# Patient Record
Sex: FEMALE | Race: ASIAN | NOT HISPANIC OR LATINO | Employment: FULL TIME | URBAN - METROPOLITAN AREA
[De-identification: names, ages, dates, MRNs, and addresses within clinical notes are randomized per-mention and may not be internally consistent; named-entity substitution may affect disease eponyms.]

---

## 2017-08-26 ENCOUNTER — HOSPITAL ENCOUNTER (OUTPATIENT)
Dept: LAB | Facility: MEDICAL CENTER | Age: 22
End: 2017-08-26
Payer: COMMERCIAL

## 2017-08-26 LAB
BDY FAT % MEASURED: 28.3 %
BP DIAS: 78 MMHG
BP SYS: 116 MMHG
CHOLEST SERPL-MCNC: 149 MG/DL (ref 100–199)
DIABETES HTDIA: NO
EVENT NAME HTEVT: NORMAL
FASTING HTFAS: YES
FASTING STATUS PATIENT QL REPORTED: NORMAL
GLUCOSE SERPL-MCNC: 75 MG/DL (ref 65–99)
HDLC SERPL-MCNC: 55 MG/DL
HYPERTENSION HTHYP: NO
LDLC SERPL CALC-MCNC: 77 MG/DL
SCREENING LOC CITY HTCIT: NORMAL
SCREENING LOC STATE HTSTA: NORMAL
SCREENING LOCATION HTLOC: NORMAL
SMOKING HTSMO: NO
SUBSCRIBER ID HTSID: NORMAL
TRIGL SERPL-MCNC: 85 MG/DL (ref 0–149)

## 2017-08-26 PROCEDURE — S5190 WELLNESS ASSESSMENT BY NONPH: HCPCS

## 2017-08-26 PROCEDURE — 80061 LIPID PANEL: CPT

## 2017-08-26 PROCEDURE — 36415 COLL VENOUS BLD VENIPUNCTURE: CPT

## 2017-08-26 PROCEDURE — 82947 ASSAY GLUCOSE BLOOD QUANT: CPT

## 2017-10-06 ENCOUNTER — IMMUNIZATION (OUTPATIENT)
Dept: OCCUPATIONAL MEDICINE | Facility: CLINIC | Age: 22
End: 2017-10-06

## 2017-10-06 DIAGNOSIS — Z23 NEED FOR VACCINATION: ICD-10-CM

## 2017-10-06 PROCEDURE — 90686 IIV4 VACC NO PRSV 0.5 ML IM: CPT | Performed by: PREVENTIVE MEDICINE

## 2017-10-14 ENCOUNTER — EH NON-PROVIDER (OUTPATIENT)
Dept: OCCUPATIONAL MEDICINE | Facility: CLINIC | Age: 22
End: 2017-10-14

## 2017-10-14 PROCEDURE — 94375 RESPIRATORY FLOW VOLUME LOOP: CPT | Performed by: PREVENTIVE MEDICINE

## 2017-11-13 DIAGNOSIS — Z30.8 ENCOUNTER FOR OTHER CONTRACEPTIVE MANAGEMENT: ICD-10-CM

## 2017-11-13 RX ORDER — NORGESTIMATE AND ETHINYL ESTRADIOL 7DAYSX3 LO
1 KIT ORAL DAILY
Qty: 84 TAB | Refills: 4 | Status: ON HOLD | OUTPATIENT
Start: 2017-11-13 | End: 2018-12-14

## 2018-04-25 ENCOUNTER — HOSPITAL ENCOUNTER (OUTPATIENT)
Dept: LAB | Facility: MEDICAL CENTER | Age: 23
End: 2018-04-25
Attending: NURSE PRACTITIONER
Payer: COMMERCIAL

## 2018-04-25 LAB
ABO GROUP BLD: NORMAL
BASOPHILS # BLD AUTO: 0.5 % (ref 0–1.8)
BASOPHILS # BLD: 0.05 K/UL (ref 0–0.12)
BLD GP AB SCN SERPL QL: NORMAL
EOSINOPHIL # BLD AUTO: 0.14 K/UL (ref 0–0.51)
EOSINOPHIL NFR BLD: 1.4 % (ref 0–6.9)
ERYTHROCYTE [DISTWIDTH] IN BLOOD BY AUTOMATED COUNT: 44 FL (ref 35.9–50)
HBV SURFACE AG SER QL: NEGATIVE
HCT VFR BLD AUTO: 40.4 % (ref 37–47)
HGB BLD-MCNC: 13.1 G/DL (ref 12–16)
HIV 1+2 AB+HIV1 P24 AG SERPL QL IA: NON REACTIVE
IMM GRANULOCYTES # BLD AUTO: 0.03 K/UL (ref 0–0.11)
IMM GRANULOCYTES NFR BLD AUTO: 0.3 % (ref 0–0.9)
LYMPHOCYTES # BLD AUTO: 2.08 K/UL (ref 1–4.8)
LYMPHOCYTES NFR BLD: 21 % (ref 22–41)
MCH RBC QN AUTO: 31 PG (ref 27–33)
MCHC RBC AUTO-ENTMCNC: 32.4 G/DL (ref 33.6–35)
MCV RBC AUTO: 95.5 FL (ref 81.4–97.8)
MONOCYTES # BLD AUTO: 0.51 K/UL (ref 0–0.85)
MONOCYTES NFR BLD AUTO: 5.2 % (ref 0–13.4)
NEUTROPHILS # BLD AUTO: 7.09 K/UL (ref 2–7.15)
NEUTROPHILS NFR BLD: 71.6 % (ref 44–72)
NRBC # BLD AUTO: 0 K/UL
NRBC BLD-RTO: 0 /100 WBC
PLATELET # BLD AUTO: 293 K/UL (ref 164–446)
PMV BLD AUTO: 9.7 FL (ref 9–12.9)
RBC # BLD AUTO: 4.23 M/UL (ref 4.2–5.4)
RH BLD: NORMAL
RUBV AB SER QL: 44.6 IU/ML
TREPONEMA PALLIDUM IGG+IGM AB [PRESENCE] IN SERUM OR PLASMA BY IMMUNOASSAY: NON REACTIVE
VZV IGG SER IA-ACNC: 0.12
WBC # BLD AUTO: 9.9 K/UL (ref 4.8–10.8)

## 2018-04-25 PROCEDURE — 86850 RBC ANTIBODY SCREEN: CPT

## 2018-04-25 PROCEDURE — 87086 URINE CULTURE/COLONY COUNT: CPT

## 2018-04-25 PROCEDURE — 86900 BLOOD TYPING SEROLOGIC ABO: CPT

## 2018-04-25 PROCEDURE — 86780 TREPONEMA PALLIDUM: CPT

## 2018-04-25 PROCEDURE — 86762 RUBELLA ANTIBODY: CPT

## 2018-04-25 PROCEDURE — 87389 HIV-1 AG W/HIV-1&-2 AB AG IA: CPT

## 2018-04-25 PROCEDURE — 86901 BLOOD TYPING SEROLOGIC RH(D): CPT

## 2018-04-25 PROCEDURE — 86787 VARICELLA-ZOSTER ANTIBODY: CPT

## 2018-04-25 PROCEDURE — 87340 HEPATITIS B SURFACE AG IA: CPT

## 2018-04-25 PROCEDURE — 85025 COMPLETE CBC W/AUTO DIFF WBC: CPT

## 2018-04-25 PROCEDURE — 36415 COLL VENOUS BLD VENIPUNCTURE: CPT

## 2018-04-27 LAB
BACTERIA UR CULT: NORMAL
SIGNIFICANT IND 70042: NORMAL
SITE SITE: NORMAL
SOURCE SOURCE: NORMAL

## 2018-06-04 ENCOUNTER — HOSPITAL ENCOUNTER (OUTPATIENT)
Dept: LAB | Facility: MEDICAL CENTER | Age: 23
End: 2018-06-04
Attending: OBSTETRICS & GYNECOLOGY
Payer: COMMERCIAL

## 2018-06-04 PROCEDURE — 81508 FTL CGEN ABNOR TWO PROTEINS: CPT

## 2018-06-04 PROCEDURE — 36415 COLL VENOUS BLD VENIPUNCTURE: CPT

## 2018-06-07 LAB
# FETUSES US: NORMAL
AGE - REPORTED: 25.2 YR
CURRENT SMOKER: NO
FET CRL US.MEAS: 61.4 MM
FET CRL US.MEAS: NORMAL MM
FET NUCHAL FOLD MOM THICKNESS US.MEAS: 0.78
FET NUCHAL FOLD MOM THICKNESS US.MEAS: NORMAL
FET NUCHAL FOLD THICKNESS US.MEAS: 1.2 MM
GA: NORMAL WK
HCG MOM SERPL: 0.56
HCG SERPL-ACNC: NORMAL IU/L
HX OF HEREDITARY DISORDERS: NO
INTEGRATED SCN PATIENT-IMP: NORMAL
NUCHAL TRANSLUCENCY (NT), TWIN B Q0252: NORMAL MM
PAPP-A MOM SERPL: 0.96
PAPP-A SERPL-MCNC: 1668.4 NG/ML
PATHOLOGY STUDY: NORMAL
SONOGRAPHER NAME: NORMAL
SONOGRAPHER: NORMAL
SPECIMEN DRAWN SERPL: NORMAL
US DATE: NORMAL

## 2018-06-29 ENCOUNTER — HOSPITAL ENCOUNTER (OUTPATIENT)
Dept: LAB | Facility: MEDICAL CENTER | Age: 23
End: 2018-06-29
Attending: OBSTETRICS & GYNECOLOGY
Payer: COMMERCIAL

## 2018-06-29 PROCEDURE — 82105 ALPHA-FETOPROTEIN SERUM: CPT

## 2018-06-29 PROCEDURE — 36415 COLL VENOUS BLD VENIPUNCTURE: CPT

## 2018-07-02 LAB
# FETUSES US: NORMAL
AFP MOM SERPL: 1.12
AFP SERPL-MCNC: 49 NG/ML
AGE - REPORTED: 25.2 YR
CURRENT SMOKER: NO
FAMILY MEMBER DISEASES HX: NO
GA METHOD: NORMAL
GA: NORMAL WK
IDDM PATIENT QL: NO
INTEGRATED SCN PATIENT-IMP: NORMAL
SPECIMEN DRAWN SERPL: NORMAL

## 2018-08-28 ENCOUNTER — DOCUMENTATION (OUTPATIENT)
Dept: OCCUPATIONAL MEDICINE | Facility: CLINIC | Age: 23
End: 2018-08-28

## 2018-08-28 NOTE — PROGRESS NOTES
Respiratory questionnaire reviewed and signed. See scanned documents.    [facility]eds [facility] due to [facility].

## 2018-09-05 ENCOUNTER — EH NON-PROVIDER (OUTPATIENT)
Dept: OCCUPATIONAL MEDICINE | Facility: CLINIC | Age: 23
End: 2018-09-05

## 2018-09-05 DIAGNOSIS — Z01.89 RESPIRATORY CLEARANCE EXAMINATION, ENCOUNTER FOR: ICD-10-CM

## 2018-09-05 PROCEDURE — 94375 RESPIRATORY FLOW VOLUME LOOP: CPT | Performed by: PREVENTIVE MEDICINE

## 2018-09-12 ENCOUNTER — HOSPITAL ENCOUNTER (OUTPATIENT)
Dept: LAB | Facility: MEDICAL CENTER | Age: 23
End: 2018-09-12
Attending: OBSTETRICS & GYNECOLOGY
Payer: COMMERCIAL

## 2018-09-12 LAB
ERYTHROCYTE [DISTWIDTH] IN BLOOD BY AUTOMATED COUNT: 43.7 FL (ref 35.9–50)
GLUCOSE 1H P 50 G GLC PO SERPL-MCNC: 138 MG/DL (ref 70–139)
HCT VFR BLD AUTO: 33.5 % (ref 37–47)
HGB BLD-MCNC: 11 G/DL (ref 12–16)
MCH RBC QN AUTO: 31.9 PG (ref 27–33)
MCHC RBC AUTO-ENTMCNC: 32.8 G/DL (ref 33.6–35)
MCV RBC AUTO: 97.1 FL (ref 81.4–97.8)
PLATELET # BLD AUTO: 252 K/UL (ref 164–446)
PMV BLD AUTO: 9.3 FL (ref 9–12.9)
RBC # BLD AUTO: 3.45 M/UL (ref 4.2–5.4)
WBC # BLD AUTO: 11.9 K/UL (ref 4.8–10.8)

## 2018-09-12 PROCEDURE — 36415 COLL VENOUS BLD VENIPUNCTURE: CPT

## 2018-09-12 PROCEDURE — 85027 COMPLETE CBC AUTOMATED: CPT

## 2018-09-12 PROCEDURE — 82950 GLUCOSE TEST: CPT

## 2018-09-20 ENCOUNTER — HOSPITAL ENCOUNTER (OUTPATIENT)
Dept: LAB | Facility: MEDICAL CENTER | Age: 23
End: 2018-09-20
Attending: OBSTETRICS & GYNECOLOGY
Payer: COMMERCIAL

## 2018-09-20 PROCEDURE — 36415 COLL VENOUS BLD VENIPUNCTURE: CPT

## 2018-09-20 PROCEDURE — 82951 GLUCOSE TOLERANCE TEST (GTT): CPT

## 2018-09-20 PROCEDURE — 82952 GTT-ADDED SAMPLES: CPT

## 2018-09-21 LAB
GLUCOSE 1H P CHAL SERPL-MCNC: 62 MG/DL (ref 65–180)
GLUCOSE 2H P CHAL SERPL-MCNC: 146 MG/DL (ref 65–155)
GLUCOSE 3H P CHAL SERPL-MCNC: 130 MG/DL (ref 65–140)
GLUCOSE BS SERPL-MCNC: 77 MG/DL (ref 65–95)

## 2018-11-13 ENCOUNTER — HOSPITAL ENCOUNTER (OUTPATIENT)
Dept: LAB | Facility: MEDICAL CENTER | Age: 23
End: 2018-11-13
Attending: OBSTETRICS & GYNECOLOGY
Payer: COMMERCIAL

## 2018-11-13 PROCEDURE — 87081 CULTURE SCREEN ONLY: CPT

## 2018-11-13 PROCEDURE — 87150 DNA/RNA AMPLIFIED PROBE: CPT

## 2018-11-14 LAB
AMBIGUOUS DTTM AMBI4: NORMAL
SIGNIFICANT IND 70042: NORMAL
SITE SITE: NORMAL
SOURCE SOURCE: NORMAL

## 2018-11-15 LAB — GP B STREP DNA SPEC QL NAA+PROBE: NEGATIVE

## 2018-12-14 ENCOUNTER — HOSPITAL ENCOUNTER (INPATIENT)
Facility: MEDICAL CENTER | Age: 23
LOS: 3 days | End: 2018-12-17
Attending: OBSTETRICS & GYNECOLOGY | Admitting: OBSTETRICS & GYNECOLOGY
Payer: COMMERCIAL

## 2018-12-14 LAB
BASOPHILS # BLD AUTO: 0.2 % (ref 0–1.8)
BASOPHILS # BLD: 0.02 K/UL (ref 0–0.12)
EOSINOPHIL # BLD AUTO: 0.09 K/UL (ref 0–0.51)
EOSINOPHIL NFR BLD: 0.9 % (ref 0–6.9)
ERYTHROCYTE [DISTWIDTH] IN BLOOD BY AUTOMATED COUNT: 46.8 FL (ref 35.9–50)
HCT VFR BLD AUTO: 39.1 % (ref 37–47)
HGB BLD-MCNC: 13.4 G/DL (ref 12–16)
HOLDING TUBE BB 8507: NORMAL
IMM GRANULOCYTES # BLD AUTO: 0.21 K/UL (ref 0–0.11)
IMM GRANULOCYTES NFR BLD AUTO: 2.2 % (ref 0–0.9)
LYMPHOCYTES # BLD AUTO: 1.92 K/UL (ref 1–4.8)
LYMPHOCYTES NFR BLD: 20.2 % (ref 22–41)
MCH RBC QN AUTO: 32.8 PG (ref 27–33)
MCHC RBC AUTO-ENTMCNC: 34.3 G/DL (ref 33.6–35)
MCV RBC AUTO: 95.6 FL (ref 81.4–97.8)
MONOCYTES # BLD AUTO: 0.61 K/UL (ref 0–0.85)
MONOCYTES NFR BLD AUTO: 6.4 % (ref 0–13.4)
NEUTROPHILS # BLD AUTO: 6.65 K/UL (ref 2–7.15)
NEUTROPHILS NFR BLD: 70.1 % (ref 44–72)
NRBC # BLD AUTO: 0 K/UL
NRBC BLD-RTO: 0 /100 WBC
PLATELET # BLD AUTO: 191 K/UL (ref 164–446)
PMV BLD AUTO: 10.4 FL (ref 9–12.9)
RBC # BLD AUTO: 4.09 M/UL (ref 4.2–5.4)
WBC # BLD AUTO: 9.5 K/UL (ref 4.8–10.8)

## 2018-12-14 PROCEDURE — A9270 NON-COVERED ITEM OR SERVICE: HCPCS | Performed by: OBSTETRICS & GYNECOLOGY

## 2018-12-14 PROCEDURE — 700111 HCHG RX REV CODE 636 W/ 250 OVERRIDE (IP): Performed by: OBSTETRICS & GYNECOLOGY

## 2018-12-14 PROCEDURE — 700111 HCHG RX REV CODE 636 W/ 250 OVERRIDE (IP)

## 2018-12-14 PROCEDURE — 0KQM0ZZ REPAIR PERINEUM MUSCLE, OPEN APPROACH: ICD-10-PCS | Performed by: OBSTETRICS & GYNECOLOGY

## 2018-12-14 PROCEDURE — 700111 HCHG RX REV CODE 636 W/ 250 OVERRIDE (IP): Performed by: ANESTHESIOLOGY

## 2018-12-14 PROCEDURE — 59409 OBSTETRICAL CARE: CPT

## 2018-12-14 PROCEDURE — 700105 HCHG RX REV CODE 258

## 2018-12-14 PROCEDURE — 303615 HCHG EPIDURAL/SPINAL ANESTHESIA FOR LABOR

## 2018-12-14 PROCEDURE — 36415 COLL VENOUS BLD VENIPUNCTURE: CPT

## 2018-12-14 PROCEDURE — 700105 HCHG RX REV CODE 258: Performed by: OBSTETRICS & GYNECOLOGY

## 2018-12-14 PROCEDURE — 85025 COMPLETE CBC W/AUTO DIFF WBC: CPT

## 2018-12-14 PROCEDURE — 770002 HCHG ROOM/CARE - OB PRIVATE (112)

## 2018-12-14 PROCEDURE — 700102 HCHG RX REV CODE 250 W/ 637 OVERRIDE(OP): Performed by: OBSTETRICS & GYNECOLOGY

## 2018-12-14 PROCEDURE — A9270 NON-COVERED ITEM OR SERVICE: HCPCS

## 2018-12-14 PROCEDURE — 700102 HCHG RX REV CODE 250 W/ 637 OVERRIDE(OP)

## 2018-12-14 PROCEDURE — 700105 HCHG RX REV CODE 258: Performed by: ANESTHESIOLOGY

## 2018-12-14 PROCEDURE — 304965 HCHG RECOVERY SERVICES

## 2018-12-14 RX ORDER — ONDANSETRON 2 MG/ML
INJECTION INTRAMUSCULAR; INTRAVENOUS
Status: COMPLETED
Start: 2018-12-14 | End: 2018-12-14

## 2018-12-14 RX ORDER — MISOPROSTOL 200 UG/1
600 TABLET ORAL
Status: DISCONTINUED | OUTPATIENT
Start: 2018-12-14 | End: 2018-12-14 | Stop reason: HOSPADM

## 2018-12-14 RX ORDER — ACETAMINOPHEN 325 MG/1
975 TABLET ORAL ONCE
Status: COMPLETED | OUTPATIENT
Start: 2018-12-14 | End: 2018-12-14

## 2018-12-14 RX ORDER — MISOPROSTOL 200 UG/1
800 TABLET ORAL ONCE
Status: COMPLETED | OUTPATIENT
Start: 2018-12-14 | End: 2018-12-14

## 2018-12-14 RX ORDER — ONDANSETRON 4 MG/1
4 TABLET, ORALLY DISINTEGRATING ORAL EVERY 6 HOURS PRN
Status: DISCONTINUED | OUTPATIENT
Start: 2018-12-14 | End: 2018-12-17 | Stop reason: HOSPADM

## 2018-12-14 RX ORDER — OXYCODONE HYDROCHLORIDE AND ACETAMINOPHEN 5; 325 MG/1; MG/1
1 TABLET ORAL EVERY 4 HOURS PRN
Status: DISCONTINUED | OUTPATIENT
Start: 2018-12-14 | End: 2018-12-17 | Stop reason: HOSPADM

## 2018-12-14 RX ORDER — IBUPROFEN 600 MG/1
600 TABLET ORAL EVERY 6 HOURS PRN
Status: DISCONTINUED | OUTPATIENT
Start: 2018-12-14 | End: 2018-12-17 | Stop reason: HOSPADM

## 2018-12-14 RX ORDER — SODIUM CHLORIDE 9 MG/ML
INJECTION, SOLUTION INTRAVENOUS
Status: COMPLETED
Start: 2018-12-14 | End: 2018-12-15

## 2018-12-14 RX ORDER — BUPIVACAINE HYDROCHLORIDE 2.5 MG/ML
INJECTION, SOLUTION EPIDURAL; INFILTRATION; INTRACAUDAL
Status: ACTIVE
Start: 2018-12-14 | End: 2018-12-15

## 2018-12-14 RX ORDER — ACETAMINOPHEN 325 MG/1
325 TABLET ORAL EVERY 4 HOURS PRN
Status: DISCONTINUED | OUTPATIENT
Start: 2018-12-14 | End: 2018-12-17 | Stop reason: HOSPADM

## 2018-12-14 RX ORDER — METHYLERGONOVINE MALEATE 0.2 MG/ML
0.2 INJECTION INTRAVENOUS
Status: DISCONTINUED | OUTPATIENT
Start: 2018-12-14 | End: 2018-12-14 | Stop reason: HOSPADM

## 2018-12-14 RX ORDER — MISOPROSTOL 200 UG/1
TABLET ORAL
Status: COMPLETED
Start: 2018-12-14 | End: 2018-12-14

## 2018-12-14 RX ORDER — LIDOCAINE HYDROCHLORIDE 10 MG/ML
INJECTION, SOLUTION EPIDURAL; INFILTRATION; INTRACAUDAL; PERINEURAL
Status: COMPLETED
Start: 2018-12-14 | End: 2018-12-14

## 2018-12-14 RX ORDER — SODIUM CHLORIDE, SODIUM LACTATE, POTASSIUM CHLORIDE, AND CALCIUM CHLORIDE .6; .31; .03; .02 G/100ML; G/100ML; G/100ML; G/100ML
250 INJECTION, SOLUTION INTRAVENOUS PRN
Status: DISCONTINUED | OUTPATIENT
Start: 2018-12-14 | End: 2018-12-14 | Stop reason: HOSPADM

## 2018-12-14 RX ORDER — ONDANSETRON 2 MG/ML
4 INJECTION INTRAMUSCULAR; INTRAVENOUS EVERY 6 HOURS PRN
Status: DISCONTINUED | OUTPATIENT
Start: 2018-12-14 | End: 2018-12-17 | Stop reason: HOSPADM

## 2018-12-14 RX ORDER — AMPICILLIN 2 G/1
INJECTION, POWDER, FOR SOLUTION INTRAVENOUS
Status: COMPLETED
Start: 2018-12-14 | End: 2018-12-14

## 2018-12-14 RX ORDER — SODIUM CHLORIDE, SODIUM LACTATE, POTASSIUM CHLORIDE, CALCIUM CHLORIDE 600; 310; 30; 20 MG/100ML; MG/100ML; MG/100ML; MG/100ML
INJECTION, SOLUTION INTRAVENOUS
Status: ACTIVE
Start: 2018-12-14 | End: 2018-12-15

## 2018-12-14 RX ORDER — ROPIVACAINE HYDROCHLORIDE 2 MG/ML
INJECTION, SOLUTION EPIDURAL; INFILTRATION; PERINEURAL CONTINUOUS
Status: DISCONTINUED | OUTPATIENT
Start: 2018-12-14 | End: 2018-12-15 | Stop reason: ALTCHOICE

## 2018-12-14 RX ORDER — OXYCODONE AND ACETAMINOPHEN 10; 325 MG/1; MG/1
1 TABLET ORAL EVERY 4 HOURS PRN
Status: DISCONTINUED | OUTPATIENT
Start: 2018-12-14 | End: 2018-12-17 | Stop reason: HOSPADM

## 2018-12-14 RX ORDER — SODIUM CHLORIDE, SODIUM LACTATE, POTASSIUM CHLORIDE, CALCIUM CHLORIDE 600; 310; 30; 20 MG/100ML; MG/100ML; MG/100ML; MG/100ML
INJECTION, SOLUTION INTRAVENOUS
Status: COMPLETED
Start: 2018-12-14 | End: 2018-12-14

## 2018-12-14 RX ORDER — SODIUM CHLORIDE, SODIUM LACTATE, POTASSIUM CHLORIDE, AND CALCIUM CHLORIDE .6; .31; .03; .02 G/100ML; G/100ML; G/100ML; G/100ML
1000 INJECTION, SOLUTION INTRAVENOUS
Status: DISCONTINUED | OUTPATIENT
Start: 2018-12-14 | End: 2018-12-14 | Stop reason: HOSPADM

## 2018-12-14 RX ADMIN — LIDOCAINE HYDROCHLORIDE: 10 INJECTION, SOLUTION EPIDURAL; INFILTRATION; INTRACAUDAL; PERINEURAL at 13:00

## 2018-12-14 RX ADMIN — GENTAMICIN SULFATE 274 MG: 40 INJECTION, SOLUTION INTRAMUSCULAR; INTRAVENOUS at 19:30

## 2018-12-14 RX ADMIN — ROPIVACAINE HYDROCHLORIDE: 2 INJECTION, SOLUTION EPIDURAL; INFILTRATION at 13:36

## 2018-12-14 RX ADMIN — ONDANSETRON 4 MG: 2 INJECTION INTRAMUSCULAR; INTRAVENOUS at 17:34

## 2018-12-14 RX ADMIN — Medication 2000 ML/HR: at 20:02

## 2018-12-14 RX ADMIN — SODIUM CHLORIDE, POTASSIUM CHLORIDE, SODIUM LACTATE AND CALCIUM CHLORIDE 1000 ML: 600; 310; 30; 20 INJECTION, SOLUTION INTRAVENOUS at 10:06

## 2018-12-14 RX ADMIN — Medication 125 ML/HR: at 21:15

## 2018-12-14 RX ADMIN — AMPICILLIN SODIUM: 2 INJECTION, POWDER, FOR SOLUTION INTRAMUSCULAR; INTRAVENOUS at 18:25

## 2018-12-14 RX ADMIN — MISOPROSTOL 800 MCG: 200 TABLET ORAL at 20:13

## 2018-12-14 RX ADMIN — Medication 1 MILLI-UNITS/MIN: at 10:04

## 2018-12-14 RX ADMIN — SODIUM CHLORIDE, POTASSIUM CHLORIDE, SODIUM LACTATE AND CALCIUM CHLORIDE 250 ML: 600; 310; 30; 20 INJECTION, SOLUTION INTRAVENOUS at 12:29

## 2018-12-14 RX ADMIN — SODIUM CHLORIDE: 900 INJECTION INTRAVENOUS at 18:30

## 2018-12-14 RX ADMIN — ACETAMINOPHEN 975 MG: 325 TABLET, FILM COATED ORAL at 17:03

## 2018-12-14 RX ADMIN — IBUPROFEN 600 MG: 600 TABLET, FILM COATED ORAL at 22:00

## 2018-12-14 ASSESSMENT — LIFESTYLE VARIABLES
ALCOHOL_USE: NO
EVER_SMOKED: NEVER

## 2018-12-14 ASSESSMENT — PATIENT HEALTH QUESTIONNAIRE - PHQ9
2. FEELING DOWN, DEPRESSED, IRRITABLE, OR HOPELESS: NOT AT ALL
SUM OF ALL RESPONSES TO PHQ9 QUESTIONS 1 AND 2: 0
1. LITTLE INTEREST OR PLEASURE IN DOING THINGS: NOT AT ALL

## 2018-12-14 NOTE — CARE PLAN
Problem: Pain  Goal: Alleviation of Pain or a reduction in pain to the patient's comfort goal  Outcome: PROGRESSING AS EXPECTED  Pt denies pain at this time, desires all natural childbirth    Problem: Risk for Infection, Impaired Wound Healing  Goal: Remain free from signs and symptoms of infection  Outcome: PROGRESSING AS EXPECTED  Pt afebrile, no s/s of infection

## 2018-12-14 NOTE — PROGRESS NOTES
L&D Progress Note    Name:   Tracy Koenig   Date/Time:  12/14/2018 9:04 AM  Gestational Age:  40w5d  Admit Date:   12/14/2018  Admitting Dx:   Pregnancy  Labor and delivery indication for care or intervention    Subjective:  Having ctxns q5-10 min apart. Pt declines pitocin still but in bed and smiling still. Wants to go all natural.   Objective:   Vitals:    12/14/18 0459   BP: 120/88   Pulse: (!) 105   Temp: 36.8 °C (98.2 °F)   TempSrc: Oral   Weight: 64 kg (141 lb)   Height: 1.524 m (5')     Gen: no distress  FHT; cat 1, mod variability, no decels  toco q5-10  Ext: no calf pain capo le  Labs:  Recent Results (from the past 72 hour(s))   Hold Blood Bank Specimen (Not Tested)    Collection Time: 12/14/18  5:40 AM   Result Value Ref Range    Holding Tube - Bb DONE    CBC WITH DIFFERENTIAL    Collection Time: 12/14/18  5:40 AM   Result Value Ref Range    WBC 9.5 4.8 - 10.8 K/uL    RBC 4.09 (L) 4.20 - 5.40 M/uL    Hemoglobin 13.4 12.0 - 16.0 g/dL    Hematocrit 39.1 37.0 - 47.0 %    MCV 95.6 81.4 - 97.8 fL    MCH 32.8 27.0 - 33.0 pg    MCHC 34.3 33.6 - 35.0 g/dL    RDW 46.8 35.9 - 50.0 fL    Platelet Count 191 164 - 446 K/uL    MPV 10.4 9.0 - 12.9 fL    Neutrophils-Polys 70.10 44.00 - 72.00 %    Lymphocytes 20.20 (L) 22.00 - 41.00 %    Monocytes 6.40 0.00 - 13.40 %    Eosinophils 0.90 0.00 - 6.90 %    Basophils 0.20 0.00 - 1.80 %    Immature Granulocytes 2.20 (H) 0.00 - 0.90 %    Nucleated RBC 0.00 /100 WBC    Neutrophils (Absolute) 6.65 2.00 - 7.15 K/uL    Lymphs (Absolute) 1.92 1.00 - 4.80 K/uL    Monos (Absolute) 0.61 0.00 - 0.85 K/uL    Eos (Absolute) 0.09 0.00 - 0.51 K/uL    Baso (Absolute) 0.02 0.00 - 0.12 K/uL    Immature Granulocytes (abs) 0.21 (H) 0.00 - 0.11 K/uL    NRBC (Absolute) 0.00 K/uL         Assessment: Plan  Gestational Age:   40w5d  Post dates  SROM - declines pitocin at this time so rec. Nipple stim to get ctxns more frequent  Fetal tracing reassuring  Gbs negative  Pt aware at 14 hr srom  hour I'd rec prophylaxis. abx    Brittni Arriaza M.D.

## 2018-12-14 NOTE — PROGRESS NOTES
22 yo    EDC   GA 40-5   GBS neg    0450-Pt presents today c/o SROM around 3:00 am this morning. Pt stated the fluid was clear. Upon assessment, clear pooling of fluid noted and soaked pad. Pt denies UC's and VB and confirms +FM. TOCO and EFM in place. VSS.   0457-SVE 3/50/-2  0510-Reactive NST obtained  0514-Phoned Dr. Hyde, report given as stated above, admit orders received  0700-Report given to Karina ZAVALA

## 2018-12-14 NOTE — H&P
"DATE OF ADMISSION:  2018    IDENTIFICATION:  This is a 23-year-old  1, para 0 with an EDC of   2018, EGA of 40-5/7th weeks, who presents with chief complaint of \"I   broke my water.\"    HISTORY OF PRESENT ILLNESS:  The patient is a patient of Dr. Portillo.  She has   gotten good prenatal care.  Of note, she has left adnexal ovarian dermoid   approximately 7 cm and succenturiate lobe of the placenta.  Her pregnancy has   been otherwise uncomplicated.  She presents today complaining of spontaneous   rupture of membranes clear at approximately 0300, this has been confirmed.    Her cervix is 3, 50 and -2.  Fetal heart tracings reactive, category 1.  She   is not really nicoalsa.  She desires natural labor at this time.  She has   no other complaints.  No nausea, vomiting, fever, chills, change in bowel or   bladder habits.  She admits good fetal movement.  Denies symptoms of PIH,   headaches, visual changes, or epigastric pain.  She is quite excited to be   here for the birth of the child.    OBSTETRICAL HISTORY:  This is her first pregnancy.    GYNECOLOGIC HISTORY:  Denies STDs or abnormal Paps.    MEDICAL HISTORY:  ALLERGIES:  None.    CURRENT MEDICATIONS:  Prenatal vitamins.    MEDICAL PROBLEMS:  None.    SURGICAL HISTORY:  None.    SOCIAL HISTORY:  Denies alcohol, tobacco or drug use.    FAMILY HISTORY:  Noncontributory.    REVIEW OF SYSTEMS:  Review of systems x12 is negative per AMA standards   available in chart.    LABORATORY DATA:  She is AB positive, rubella immune.  She had normal first   trimester, negative AFP.  She had an elevated 1-hour, normal 3-hour.  Her CF   carrier screen was negative.    PHYSICAL EXAMINATION:  VITAL SIGNS:  Patient is afebrile.  Vital signs within normal limits.  Fetal   heart tracings reactive, category 1.  She is not nicolasa.  GENERAL:  She is awake, alert, in no apparent distress.  HEART:  Regular.  CHEST:  Clear.  BREASTS:  Symmetrical.  ABDOMEN:  Soft, " gravid, size appropriate for dates.  EXTREMITIES:  Negative.  GYNECOLOGIC:  As above.    ASSESSMENT:  At this time is:  1.  Pregnancy at 40-5/7th weeks.  2.  Premature rupture of membranes without evidence of labor.  3.  Left adnexal dermoid cyst.  4.  Succenturiate lobe of the placenta.  5.  Beta streptococcus negative.    PLAN:  At this time:  1.  Admit.  2.  Fetal heart tone and contraction monitoring.  3.  Pain control if needed.  4.  Pitocin if indicated.  5.  Consent for an anticipated normal spontaneous vaginal delivery.       ____________________________________     MD ODETTE Nolasco / BRADLEY    DD:  12/14/2018 06:21:38  DT:  12/14/2018 06:44:33    D#:  [id number]  Job#:  [id number]

## 2018-12-15 LAB
ERYTHROCYTE [DISTWIDTH] IN BLOOD BY AUTOMATED COUNT: 47.3 FL (ref 35.9–50)
HCT VFR BLD AUTO: 32 % (ref 37–47)
HGB BLD-MCNC: 10.6 G/DL (ref 12–16)
MCH RBC QN AUTO: 31.5 PG (ref 27–33)
MCHC RBC AUTO-ENTMCNC: 33.1 G/DL (ref 33.6–35)
MCV RBC AUTO: 95.2 FL (ref 81.4–97.8)
PLATELET # BLD AUTO: 168 K/UL (ref 164–446)
PMV BLD AUTO: 10.4 FL (ref 9–12.9)
RBC # BLD AUTO: 3.36 M/UL (ref 4.2–5.4)
WBC # BLD AUTO: 28.8 K/UL (ref 4.8–10.8)

## 2018-12-15 PROCEDURE — 700102 HCHG RX REV CODE 250 W/ 637 OVERRIDE(OP): Performed by: OBSTETRICS & GYNECOLOGY

## 2018-12-15 PROCEDURE — 700112 HCHG RX REV CODE 229: Performed by: OBSTETRICS & GYNECOLOGY

## 2018-12-15 PROCEDURE — 36415 COLL VENOUS BLD VENIPUNCTURE: CPT

## 2018-12-15 PROCEDURE — 770002 HCHG ROOM/CARE - OB PRIVATE (112)

## 2018-12-15 PROCEDURE — A9270 NON-COVERED ITEM OR SERVICE: HCPCS | Performed by: OBSTETRICS & GYNECOLOGY

## 2018-12-15 PROCEDURE — 700111 HCHG RX REV CODE 636 W/ 250 OVERRIDE (IP): Performed by: OBSTETRICS & GYNECOLOGY

## 2018-12-15 PROCEDURE — 85027 COMPLETE CBC AUTOMATED: CPT

## 2018-12-15 PROCEDURE — 700105 HCHG RX REV CODE 258: Performed by: OBSTETRICS & GYNECOLOGY

## 2018-12-15 RX ORDER — SODIUM CHLORIDE, SODIUM LACTATE, POTASSIUM CHLORIDE, CALCIUM CHLORIDE 600; 310; 30; 20 MG/100ML; MG/100ML; MG/100ML; MG/100ML
INJECTION, SOLUTION INTRAVENOUS PRN
Status: DISCONTINUED | OUTPATIENT
Start: 2018-12-15 | End: 2018-12-17 | Stop reason: HOSPADM

## 2018-12-15 RX ORDER — DOCUSATE SODIUM 100 MG/1
100 CAPSULE, LIQUID FILLED ORAL 2 TIMES DAILY PRN
Status: DISCONTINUED | OUTPATIENT
Start: 2018-12-15 | End: 2018-12-17 | Stop reason: HOSPADM

## 2018-12-15 RX ORDER — METHYLERGONOVINE MALEATE 0.2 MG/ML
0.2 INJECTION INTRAVENOUS
Status: DISCONTINUED | OUTPATIENT
Start: 2018-12-15 | End: 2018-12-17 | Stop reason: HOSPADM

## 2018-12-15 RX ORDER — SODIUM CHLORIDE 9 MG/ML
INJECTION, SOLUTION INTRAVENOUS
Status: COMPLETED
Start: 2018-12-15 | End: 2018-12-15

## 2018-12-15 RX ORDER — VITAMIN A ACETATE, BETA CAROTENE, ASCORBIC ACID, CHOLECALCIFEROL, .ALPHA.-TOCOPHEROL ACETATE, DL-, THIAMINE MONONITRATE, RIBOFLAVIN, NIACINAMIDE, PYRIDOXINE HYDROCHLORIDE, FOLIC ACID, CYANOCOBALAMIN, CALCIUM CARBONATE, FERROUS FUMARATE, ZINC OXIDE, CUPRIC OXIDE 3080; 12; 120; 400; 1; 1.84; 3; 20; 22; 920; 25; 200; 27; 10; 2 [IU]/1; UG/1; MG/1; [IU]/1; MG/1; MG/1; MG/1; MG/1; MG/1; [IU]/1; MG/1; MG/1; MG/1; MG/1; MG/1
1 TABLET, FILM COATED ORAL EVERY MORNING
Status: DISCONTINUED | OUTPATIENT
Start: 2018-12-15 | End: 2018-12-17 | Stop reason: HOSPADM

## 2018-12-15 RX ADMIN — AMPICILLIN SODIUM 2000 MG: 2 INJECTION, POWDER, FOR SOLUTION INTRAMUSCULAR; INTRAVENOUS at 00:02

## 2018-12-15 RX ADMIN — OXYCODONE AND ACETAMINOPHEN 1 TABLET: 5; 325 TABLET ORAL at 13:17

## 2018-12-15 RX ADMIN — AMPICILLIN SODIUM 2000 MG: 2 INJECTION, POWDER, FOR SOLUTION INTRAMUSCULAR; INTRAVENOUS at 23:59

## 2018-12-15 RX ADMIN — AMPICILLIN SODIUM 2000 MG: 2 INJECTION, POWDER, FOR SOLUTION INTRAMUSCULAR; INTRAVENOUS at 05:58

## 2018-12-15 RX ADMIN — OXYCODONE AND ACETAMINOPHEN 1 TABLET: 5; 325 TABLET ORAL at 22:20

## 2018-12-15 RX ADMIN — OXYCODONE AND ACETAMINOPHEN 1 TABLET: 5; 325 TABLET ORAL at 00:01

## 2018-12-15 RX ADMIN — OXYCODONE AND ACETAMINOPHEN 1 TABLET: 5; 325 TABLET ORAL at 18:05

## 2018-12-15 RX ADMIN — OXYCODONE AND ACETAMINOPHEN 1 TABLET: 5; 325 TABLET ORAL at 09:31

## 2018-12-15 RX ADMIN — DOCUSATE SODIUM 100 MG: 100 CAPSULE, LIQUID FILLED ORAL at 22:21

## 2018-12-15 RX ADMIN — AMPICILLIN SODIUM 2000 MG: 2 INJECTION, POWDER, FOR SOLUTION INTRAMUSCULAR; INTRAVENOUS at 12:12

## 2018-12-15 RX ADMIN — GENTAMICIN SULFATE 274 MG: 40 INJECTION, SOLUTION INTRAMUSCULAR; INTRAVENOUS at 18:54

## 2018-12-15 RX ADMIN — AMPICILLIN SODIUM 2000 MG: 2 INJECTION, POWDER, FOR SOLUTION INTRAMUSCULAR; INTRAVENOUS at 18:05

## 2018-12-15 RX ADMIN — Medication 1 TABLET: at 05:57

## 2018-12-15 ASSESSMENT — PAIN SCALES - GENERAL
PAINLEVEL_OUTOF10: 5
PAINLEVEL_OUTOF10: 2
PAINLEVEL_OUTOF10: 4
PAINLEVEL_OUTOF10: 6
PAINLEVEL_OUTOF10: 2
PAINLEVEL_OUTOF10: 2
PAINLEVEL_OUTOF10: 5
PAINLEVEL_OUTOF10: 2
PAINLEVEL_OUTOF10: 5
PAINLEVEL_OUTOF10: 3
PAINLEVEL_OUTOF10: 3

## 2018-12-15 NOTE — PROGRESS NOTES
": Received report from ELÍAS Garcia RN. POC discussed. Pt pushing with good effort.     : Dr. Thompson called for delivery.    :  of viable female infant, \" Rylynn\" APGARS /8.     :  of intact placenta, 400 EBL.    : Spoke to Jess in the NICU, updated on babies current status.    : Pt updated on babies status in the NICU, verbalizes understanding.    2300: Pt assisted to bedpan to void, since attempt to stand at bedside was unsuccessful.    2330: Pt transferred to PPU via wheelchair in stable condition. Report given to SALLY Jacobo. POC discussed.      "

## 2018-12-15 NOTE — PROGRESS NOTES
PPD#1  S/dec vb, pain ok, baby doing ok in NICU  O/  Vitals:    185 18 2057 18 2355 12/15/18 0355   BP:  135/64 128/86 112/70   Pulse:  (!) 106 78 (!) 102   Resp:   17 16   Temp: 37.2 °C (99 °F)  36.6 °C (97.9 °F) 36.2 °C (97.1 °F)   TempSrc: Oral  Temporal Temporal   SpO2:   95% 92%   Weight:       Height:         Recent Labs      18   0540  12/15/18   0334   WBC  9.5  28.8*   RBC  4.09*  3.36*   HEMOGLOBIN  13.4  10.6*   HEMATOCRIT  39.1  32.0*   MCV  95.6  95.2   MCH  32.8  31.5   RDW  46.8  47.3   PLATELETCT  191  168   MPV  10.4  10.4   NEUTSPOLYS  70.10   --    LYMPHOCYTES  20.20*   --    MONOCYTES  6.40   --    EOSINOPHILS  0.90   --    BASOPHILS  0.20   --      gen-comfortable  Abd-soft, ff and nt below umb  Ext -no calf tenderness    A&P/PPD#1s/p  c/b chorio/mec  Afebrile since delivery-wbc ct elevated; will cont abx until tomorrow am; repeat cbc in am tomorrow  Otherwise, routine care

## 2018-12-15 NOTE — PROGRESS NOTES
Pharmacy Kinetics 23 y.o. female on gentamicin day # 2  12/15/2018    Dosing Weight: 54.8 kg  Currently on Gentamicin 274 mg iv q24hr    Indication for treatment: chorioamnionitis    Pertinent history per medical record: Admitted on 2018 for broken water at 40 5/7 weeks (no other signs of active labor). Spiked fever hours after delivery, abx ordered.    Other antibiotics: ampicillin    Allergies: Patient has no known allergies.     List concerns for renal function: none noted    Pertinent cultures to date:   GBS negative    Recent Labs      18   0540  12/15/18   0334   WBC  9.5  28.8*   NEUTSPOLYS  70.10   --      No results for input(s): BUN, CREATININE, ALBUMIN in the last 72 hours.  No results for input(s): GENTTROUGH, GENTPEAK, GENTRANDOM in the last 72 hours.  Intake/Output Summary (Last 24 hours) at 12/15/18 1417  Last data filed at 18 1956   Gross per 24 hour   Intake                0 ml   Output              400 ml   Net             -400 ml      Blood pressure 112/73, pulse (!) 109, temperature 36.9 °C (98.4 °F), temperature source Temporal, resp. rate 16, height 1.524 m (5'), weight 64 kg (141 lb), SpO2 94 %, currently breastfeeding. Temp (24hrs), Av.3 °C (99.2 °F), Min:36.2 °C (97.1 °F), Max:38.3 °C (100.9 °F)      A/P   1. Gentamicin dose change: not indicated  2. Next gentamicin level: TBD if therapy to exceed 3 days  3. Goal trough: undetectable  4. Comments: Anticipate abx d/c tomorrow per MD note. No trough needed unless duration extended. CTM.    Betty Blevins, PharmD, BCOP

## 2018-12-15 NOTE — PROGRESS NOTES
S/ comfortable with her epidural  O/  Vitals:    12/14/18 0459 12/14/18 0800 12/14/18 1100 12/14/18 1400   BP: 120/88      Pulse: (!) 105      Temp: 36.8 °C (98.2 °F) 36.7 °C (98.1 °F) 36.9 °C (98.5 °F) 37.3 °C (99.1 °F)   TempSrc: Oral Temporal Temporal Axillary   Weight: 64 kg (141 lb)      Height: 1.524 m (5')        Sve-c/c/+2  efm-cat 1  toco-q2-3    A&P/IUP at 40w5d  PRom  Tylenol for temp 100.2  EFM reassuring  Will follow

## 2018-12-15 NOTE — PROGRESS NOTES
Assumed care of PT, PT on birthing ball, she denies feeling UC's.  She would like to wait for any augmentation and see if body takes over.     0845 Arsalan at BS discussed nipple stimulation and if labor does not start soon to consider Pitocin.    1000 PT called out requesting pitocin    1205 SVE 4/90/-1    [id number] Dr Haskins at BS for epidural placement. MD unable to to place epidural, 2nd Anesthesiologist called.     1320 Miriam at BS of epidural placement.    1425 SVE 6//90/-1    1655 MD at BS of SVE    1810 Call to Raul regarding maternal fever, ABX ordered.    1815 MD at BS to assess pushing and FHT.     1900 Report to STEWART Mittal.

## 2018-12-15 NOTE — PROGRESS NOTES
Pt arrived to unit at 2345 and bedside report given by Zoya ZAVALA. AAOx3, no s/s distress. Oriented pt and sig other to unit and room. Fundus is firm and lochia light, demonstrated use of cale bottle and use of tucks and spray. Educated pt on pumping and pt demonstrated use. Will cont to monitor.

## 2018-12-15 NOTE — PROGRESS NOTES
PUshing with good effort  +3 station, ROT, can feel fingers below baby's anterior ear  Temp 100.9  EFM-baseline 150-160's with variability and accels  Will start amp and gent  Cont pushing

## 2018-12-15 NOTE — L&D DELIVERY NOTE
DATE OF SERVICE:  2018    PREOPERATIVE DIAGNOSES:  1.  A 23-year-old  1, para 0, 40 weeks and 5 days.  2.  Premature rupture of membranes.  3.  Group B Streptococcus negative.  4.  Left adnexal dermoid cyst.  5.  Succenturiate lobe of the placenta.    POSTOPERATIVE DIAGNOSES:  1.  A 23-year-old  1, para 0, 40 weeks and 5 days.  2.  Premature rupture of membranes.  3.  Group B Streptococcus negative.  4.  Left adnexal dermoid cyst.  5.  Succenturiate lobe of the placenta.  6.  Meconium-stained amniotic fluid.  7.  Chorioamnionitis.    PRIMARY OBSTETRICIAN:  Brittni Arriaza MD    DELIVERING OBSTETRICIAN:  Keyanna Carter MD    PROCEDURES:  1.  Normal spontaneous vaginal delivery.  2.  Repair of second-degree midline laceration.    ANESTHESIA:  An epidural.    ANESTHESIOLOGIST:  Dr. Haskins.    FINDINGS:  Female infant, JOHN presentation with thick meconium-stained   amniotic fluid, Apgars at 1, 4 and 8, weight is pending.    PROCEDURE NOTE:  This is a 23-year-old , 40 weeks and 5 days, patient of   Dr. Arriaza's who had an overall uncomplicated pregnancy.  She was noted to have   a left adnexal ovarian dermoid tumor.  It has been approximately 7 cm and   also a succenturiate lobe of her placenta.  She presented early this morning   and was admitted by my partners and was signed out to me this afternoon after   she agreed to start Pitocin and had an epidural in place.  Fetal heart tracing   at that time was category 1.  Once she was comfortable and we were able to   get her nicolasa in labor, she progressed to 6 cm and then within a couple   of hours was complete.  She went on to push almost 3 hours.  During the first   hour of pushing, she was noted to have a temperature of 100.9.  Baby's   baseline was 150s-160s at that point with good variability, accelerations and   scalp stim.  We started ampicillin 2 g and gentamicin and she continued to   push.  She was also given Tylenol at that  time.  She continued to push, and   after about 2 hours of pushing, meconium-stained amniotic fluid was noted.    She was pushing well and baby was descending and she went on eventually to   deliver a female infant from OA presentation.  Delivery of the head was manual   assisted.  There was a loose nuchal cord that was pushed over the body and   the cord was doubly clamped and cut.  Shoulders and the rest of body followed   without difficulty.  Baby was immediately handed over to the awaiting   respiratory therapist and nursing staff for evacuation of the meconium.  A   segment of cord gases was handed off and placenta including the succenturiate   lobe delivered without incident.  This was spontaneous and intact and a   3-vessel cord was noted.  Second-degree midline uterus.  Pitocin was started   just before delivery of the placenta and there was some mild atony initially.    Her uterus firmed up with bimanual massage and I also placed 800 mcg of   Cytotec.  I inspection of the perineum revealed a second-degree midline   laceration.  This was repaired with 3-0 Vicryl.  Uterus remained overall firm.    Total EBL was 400 mL.  Mom is doing well.  Baby is being taken to the NICU   for further observation.  Sponge and needle counts were correct.       ____________________________________     MD KWABENA Hudson / BRADLEY    DD:  12/14/2018 20:41:43  DT:  12/14/2018 21:32:27    D#:  [id number]  Job#:  [id number]

## 2018-12-15 NOTE — PROGRESS NOTES
Pharmacy Kinetics 23 y.o. female on gentamicin day # 1 2018    Dosing Weight: 54.8 kg (actual BW + Ideal BW)/2  Currently on Gentamicin 274 mg iv q24hr    Indication for treatment: Chorioamnionitis     Pertinent history per medical record: Admitted on 2018 for active labor.    Other antibiotics:   - Ampicillin 2000 mg iv q6h    Allergies: Patient has no known allergies.     List concerns for renal function:   - Fever, tachycardia    Pertinent cultures to date:   - 2018 - Vaginal/rectal GBS Neg    Recent Labs      18   0540   WBC  9.5   NEUTSPOLYS  70.10     No results for input(s): BUN, CREATININE, ALBUMIN in the last 72 hours.  No results for input(s): GENTTROUGH, GENTPEAK, GENTRANDOM in the last 72 hours.  Intake/Output Summary (Last 24 hours) at 18  Last data filed at 18 1956   Gross per 24 hour   Intake                0 ml   Output              400 ml   Net             -400 ml      Blood pressure 135/64, pulse (!) 106, temperature 37.2 °C (99 °F), temperature source Oral, height 1.524 m (5'), weight 64 kg (141 lb), not currently breastfeeding. Temp (24hrs), Av.4 °C (99.3 °F), Min:36.7 °C (98.1 °F), Max:38.3 °C (100.9 °F)      A/P   1. Gentamicin dose change: New start  2. Next gentamicin level: In 2 to 3 days if therapy continues  3. Goal trough: < 1 mcg/mL  4. Comments: Development of fever during delivery, dosing gentamicin per protocol at 5 mg/kg q24h using calculated body weight - calculated at 54.8 kg.  Pt is GBS negative.  Pharmacy to monitor and make dose adjustments as appropriate.      River Steele, LocD

## 2018-12-15 NOTE — PROGRESS NOTES
0700- Received report from SALLY Jacobo.     Assessment performed, VSS. POC discussed with patient, all questions answered. Reviewed pumping schedule. Pt requests pain meds as needed. Will continue to monitor.

## 2018-12-15 NOTE — CARE PLAN
Problem: Communication  Goal: The ability to communicate needs accurately and effectively will improve  Outcome: PROGRESSING AS EXPECTED  Pt educated on call light and encouraged to voice questions and concerns. Doing so appropriately     Problem: Safety  Goal: Will remain free from injury  Outcome: PROGRESSING AS EXPECTED  Pt educated on fall safety. Well lite and clutter free room.

## 2018-12-16 LAB
BASOPHILS # BLD AUTO: 0.3 % (ref 0–1.8)
BASOPHILS # BLD: 0.05 K/UL (ref 0–0.12)
EOSINOPHIL # BLD AUTO: 0.11 K/UL (ref 0–0.51)
EOSINOPHIL NFR BLD: 0.6 % (ref 0–6.9)
ERYTHROCYTE [DISTWIDTH] IN BLOOD BY AUTOMATED COUNT: 47.3 FL (ref 35.9–50)
HCT VFR BLD AUTO: 30.1 % (ref 37–47)
HGB BLD-MCNC: 10.1 G/DL (ref 12–16)
IMM GRANULOCYTES # BLD AUTO: 0.2 K/UL (ref 0–0.11)
IMM GRANULOCYTES NFR BLD AUTO: 1.1 % (ref 0–0.9)
LYMPHOCYTES # BLD AUTO: 2.14 K/UL (ref 1–4.8)
LYMPHOCYTES NFR BLD: 12 % (ref 22–41)
MCH RBC QN AUTO: 31.8 PG (ref 27–33)
MCHC RBC AUTO-ENTMCNC: 33.6 G/DL (ref 33.6–35)
MCV RBC AUTO: 94.7 FL (ref 81.4–97.8)
MONOCYTES # BLD AUTO: 0.92 K/UL (ref 0–0.85)
MONOCYTES NFR BLD AUTO: 5.2 % (ref 0–13.4)
NEUTROPHILS # BLD AUTO: 14.43 K/UL (ref 2–7.15)
NEUTROPHILS NFR BLD: 80.8 % (ref 44–72)
NRBC # BLD AUTO: 0 K/UL
NRBC BLD-RTO: 0 /100 WBC
PLATELET # BLD AUTO: 164 K/UL (ref 164–446)
PMV BLD AUTO: 10.5 FL (ref 9–12.9)
RBC # BLD AUTO: 3.18 M/UL (ref 4.2–5.4)
WBC # BLD AUTO: 17.9 K/UL (ref 4.8–10.8)

## 2018-12-16 PROCEDURE — 700111 HCHG RX REV CODE 636 W/ 250 OVERRIDE (IP): Performed by: OBSTETRICS & GYNECOLOGY

## 2018-12-16 PROCEDURE — 770002 HCHG ROOM/CARE - OB PRIVATE (112)

## 2018-12-16 PROCEDURE — 700105 HCHG RX REV CODE 258: Performed by: OBSTETRICS & GYNECOLOGY

## 2018-12-16 PROCEDURE — A9270 NON-COVERED ITEM OR SERVICE: HCPCS | Performed by: OBSTETRICS & GYNECOLOGY

## 2018-12-16 PROCEDURE — 85025 COMPLETE CBC W/AUTO DIFF WBC: CPT

## 2018-12-16 PROCEDURE — 700102 HCHG RX REV CODE 250 W/ 637 OVERRIDE(OP): Performed by: OBSTETRICS & GYNECOLOGY

## 2018-12-16 PROCEDURE — 36415 COLL VENOUS BLD VENIPUNCTURE: CPT

## 2018-12-16 RX ADMIN — OXYCODONE AND ACETAMINOPHEN 1 TABLET: 5; 325 TABLET ORAL at 02:24

## 2018-12-16 RX ADMIN — ONDANSETRON 4 MG: 4 TABLET, ORALLY DISINTEGRATING ORAL at 08:20

## 2018-12-16 RX ADMIN — AMPICILLIN SODIUM 2000 MG: 2 INJECTION, POWDER, FOR SOLUTION INTRAMUSCULAR; INTRAVENOUS at 06:25

## 2018-12-16 RX ADMIN — Medication 1 TABLET: at 06:25

## 2018-12-16 RX ADMIN — OXYCODONE AND ACETAMINOPHEN 1 TABLET: 5; 325 TABLET ORAL at 06:25

## 2018-12-16 ASSESSMENT — PAIN SCALES - GENERAL
PAINLEVEL_OUTOF10: 4
PAINLEVEL_OUTOF10: 2
PAINLEVEL_OUTOF10: 2

## 2018-12-16 NOTE — PROGRESS NOTES
"0700- Received report SALLY Jacobo.     Assessment performed, VSS. Pt complains of nausea, given zofran, sprite, and ice chips. POC disucssed, pt to d/c today. Pt to visit infant in ICN, coordinate plan with ICN nurse for d/c. Pt request pain meds as needed. Will continue to monitor.     1045 - pt to ICN, states \"feeling better, the ice chips helped\".   "

## 2018-12-16 NOTE — PROGRESS NOTES
"Updated pt and sig other on POC and updated whiteboard. AAOx3, no s/s of distress. Fundus firm and lochia light. In agreement with pain mgmt plan at this time. Pt pumping, expressed concern that \"not enough milk is being made\", provided support and education regarding pumping and colostrum.   "

## 2018-12-16 NOTE — LACTATION NOTE
Mother states she has pumped three times, denies pain when she pumps, verified understanding of proper pump use and settings, encouraged to set pump suction to comfort and to decrease speed 80/60 at 2 minutes, discussed the importance of frequent pumping at least 8 times every 24 hours, discussed supply and demand in relation to milk supply initiation.    Plan:  Q 2-3 hours pump for 15 minutes  Leave time for a 5 hour stretch of sleep at night    Mother has HHP, discussed need for HG pump when  from baby for NICU stay, rental pump information provided, parents to  HG pump for home use from [facility] desk    Discussed assistance available at [facility] after discharge, invited to  [place] and encouraged to call to schedule 1:1 consult as needed

## 2018-12-16 NOTE — PROGRESS NOTES
Post Partum Progress Note    Name:   Tracy Koenig   Date/Time:  12/16/2018 - 12:22 PM  Chief Admitting Dx:  Pregnancy  Labor and delivery indication for care or intervention  Delivery Type:  vaginal, spontaneous  Post-Op/Post Partum Days #:  2    Subjective:  Afebrile x 24 hours.   abx were discontiued earlier this am  Pt is in nicu with the baby but per RN the pt is doing ok.     Vitals:    12/15/18 0355 12/15/18 0800 12/15/18 2000 12/16/18 0800   BP: 112/70 112/73 111/77 119/80   Pulse: (!) 102 (!) 109 100 97   Resp: 16 16 16 16   Temp: 36.2 °C (97.1 °F) 36.9 °C (98.4 °F) 36.6 °C (97.9 °F) 37 °C (98.6 °F)   TempSrc: Temporal Temporal Temporal Temporal   SpO2: 92% 94% 93% 95%   Weight:       Height:           Exam:  Deferred as pt in nicu  Meds:  Current Facility-Administered Medications   Medication Dose   • LR infusion     • PRN oxytocin (PITOCIN) (20 Units/1000 mL) PRN for excessive uterine bleeding - See Admin Instr  125-999 mL/hr   • methylergonovine (METHERGINE) injection 0.2 mg  0.2 mg   • docusate sodium (COLACE) capsule 100 mg  100 mg   • prenatal plus vitamin (STUARTNATAL 1+1) 27-1 MG tablet 1 Tab  1 Tab   • ondansetron (ZOFRAN ODT) dispertab 4 mg  4 mg    Or   • ondansetron (ZOFRAN) syringe/vial injection 4 mg  4 mg   • oxytocin (PITOCIN) infusion (for induction)  0.5-20 abigail-units/min   • oxytocin (PITOCIN) infusion (for postpartum)   mL/hr   • ibuprofen (MOTRIN) tablet 600 mg  600 mg   • acetaminophen (TYLENOL) tablet 325 mg  325 mg   • oxyCODONE-acetaminophen (PERCOCET) 5-325 MG per tablet 1 Tab  1 Tab   • oxyCODONE-acetaminophen (PERCOCET-10)  MG per tablet 1 Tab  1 Tab       Labs:   Recent Labs      12/14/18   0540  12/15/18   0334  12/16/18   0354   WBC  9.5  28.8*  17.9*   RBC  4.09*  3.36*  3.18*   HEMOGLOBIN  13.4  10.6*  10.1*   HEMATOCRIT  39.1  32.0*  30.1*   MCV  95.6  95.2  94.7   MCH  32.8  31.5  31.8   MCHC  34.3  33.1*  33.6   RDW  46.8  47.3  47.3   PLATELETCT  191   168  164   MPV  10.4  10.4  10.5       Assessment:  Pp day 2  Plan:  abx discontinued, pt is afebrile and pulse has come down  Wbc decreased to 18k today.   Will be 48hrs post delivery tonight but will plan to keep her until 24hrs afebrile off abx and plan for d/c tomorrow am.

## 2018-12-17 VITALS
HEIGHT: 60 IN | SYSTOLIC BLOOD PRESSURE: 126 MMHG | RESPIRATION RATE: 16 BRPM | HEART RATE: 100 BPM | DIASTOLIC BLOOD PRESSURE: 86 MMHG | WEIGHT: 141 LBS | OXYGEN SATURATION: 95 % | TEMPERATURE: 98.7 F | BODY MASS INDEX: 27.68 KG/M2

## 2018-12-17 ASSESSMENT — PAIN SCALES - GENERAL
PAINLEVEL_OUTOF10: 2
PAINLEVEL_OUTOF10: 1
PAINLEVEL_OUTOF10: 2

## 2018-12-17 NOTE — PROGRESS NOTES
Discharged to home but rooming in in NICU with baby. Ambulated down to NICU without difficulty. Instructions given on self care

## 2018-12-17 NOTE — PROGRESS NOTES
Progress Note    Tracy Koenig   PP day 3  Chief Admitting Dx: Pregnancy  Labor and delivery indication for care or intervention  Delivery Type: vaginal, spontaneous      Subjective:  No fever off abx x 24 hrs now.   Ambulating, voiding, eduardo diet, normal lochia  Pain controlled w/ motrin  Vitals:    12/16/18 1300 12/16/18 2000 12/17/18 0000 12/17/18 0400   BP: 122/82 127/84 129/78 119/77   Pulse: 94 (!) 104 97 78   Resp: 16 18 18 16   Temp: 36.9 °C (98.5 °F) 36.8 °C (98.2 °F) 36.9 °C (98.5 °F) 36.6 °C (97.8 °F)   TempSrc: Temporal Oral Oral Oral   SpO2: 98% 97% 95% 95%   Weight:       Height:           Exam:  Gen no acute distress  Abdomen: soft nt/nd firm fundus  Ext: no calf pain capo LE      Labs:   No results found for this or any previous visit (from the past 24 hour(s)).    Assessment:  Ppd 3  Plan:  chorio - resolved, no fever off abx now  Baby still in icn but doing well  Discharge home  Encourage ambulation  Pelvic rest, no heavy lifting/pulling /pushing  F/u in my office 6 weeks postpartum  Continue prenatal vitamins daily  Give Rhogam if Rh negative and indicated  Give MMR if indcated prior to discharge  Encourage breastfeeding       Brittni Arriaza M.D.

## 2018-12-17 NOTE — DISCHARGE INSTRUCTIONS
POSTPARTUM DISCHARGE INSTRUCTIONS FOR MOM    YOB: 1995   Age: 23 y.o.               Admit Date: 2018     Discharge Date: 2018  Attending Doctor:  Brittni Arriaza M.D.                  Allergies:  Patient has no known allergies.    Discharged to home by car. Discharged via wheelchair, hospital escort: Yes.  Special equipment needed: Not Applicable  Belongings with: Personal  Be sure to schedule a follow-up appointment with your primary care doctor or any specialists as instructed.     Discharge Plan:   Diet Plan: Discussed  Activity Level: Discussed  Confirmed Follow up Appointment: Patient to Call and Schedule Appointment  Confirmed Symptoms Management: Discussed  Medication Reconciliation Updated: Yes  Influenza Vaccine Indication: Not indicated: Previously immunized this influenza season and > 8 years of age    REASONS TO CALL YOUR OBSTETRICIAN:  1.   Persistent fever or shaking chills (Temperature higher than 100.4)  2.   Heavy bleeding (soaking more than 1 pad per hour); Passing clots  3.   Foul odor from vagina  4.   Mastitis (Breast infection; breast pain, chills, fever, redness)  5.   Urinary pain, burning or frequency  6.   Episiotomy infection  7.   Abdominal incision infection  8.   Severe depression longer than 24 hours    HAND WASHING  · Prior to handling the baby.  · Before breastfeeding or bottle feeding baby.  · After using the bathroom or changing the baby's diaper.    WOUND CARE  Ask your physician for additional care instructions.  In general:    ·  Incision:      · Keep clean and dry.    · Do NOT lift anything heavier than your baby for up to 6 weeks.    · There should not be any opening or pus.      VAGINAL CARE  · Nothing inside vagina for 6 weeks: no sexual intercourse, tampons or douching.  · Bleeding may continue for 2-4 weeks.  Amount may vary.    · Call your physician for heavy bleeding which means soaking more than 1 pad per hour    BIRTH CONTROL  · It is  "possible to become pregnant at any time after delivery and while breastfeeding.  · Plan to discuss a method of birth control with your physician at your follow up visit. visit.    DIET AND ELIMINATION  · Eating more fiber (bran cereal, fruits, and vegetables) and drinking plenty of fluids will help to avoid constipation.  · Urinary frequency after childbirth is normal.    POSTPARTUM BLUES  During the first few days after birth, you may experience a sense of the \"blues\" which may include impatience, irritability or even crying.  These feeling come and go quickly.  However, as many as 1 in 10 women experience emotional symptoms known as postpartum depression.    Postpartum depression:  May start as early as the second or third day after delivery or take several weeks or months to develop.  Symptoms of \"blues\" are present, but are more intense:  Crying spells; loss of appetite; feelings of hopelessness or loss of control; fear of touching the baby; over concern or no concern at all about the baby; little or no concern about your own appearance/caring for yourself; and/or inability to sleep or excessive sleeping.  Contact your physician if you are experiencing any of these symptoms.    Crisis Hotline:  · [place] Crisis Hotline:  5-582-HICQDRI  Or 1-584.645.1314  · [place] Crisis Hotline:  1-596.837.2124  Or 735-612-1000    PREVENTING SHAKEN BABY:  If you are angry or stressed, PUT THE BABY IN THE CRIB, step away, take some deep breaths, and wait until you are calm to care for the baby.  DO NOT SHAKE THE BABY.  You are not alone, call a supporter for help.    · Crisis [facility] Center 24/7 crisis line 785-733-1841 or 1-155.687.3297  · You can also text them, text \"ANSWER\" to 248589    QUIT SMOKING/TOBACCO USE:  I understand the use of any tobacco products increases my chance of suffering from future heart disease and could cause other illnesses which may shorten my life. Quitting the use of tobacco products is the single most " important thing I can do to improve my health. For further information on smoking / tobacco cessation call a Toll Free Quit Line at 1-883.522.2536 (*[employer]) or 1-645.268.9380 (American Lung Association) or you can access the web based program at www.lungusa.org.    · [place] Tobacco Users Help Line:  (502) 938-6995       Toll Free: 1-935.548.7911  · Quit Tobacco Program [facility] (347)082-9542    DEPRESSION / SUICIDE RISK:  As you are discharged from this [facility], it is important to learn how to keep safe from harming yourself.    Recognize the warning signs:  · Abrupt changes in personality, positive or negative- including increase in energy   · Giving away possessions  · Change in eating patterns- significant weight changes-  positive or negative  · Change in sleeping patterns- unable to sleep or sleeping all the time   · Unwillingness or inability to communicate  · Depression  · Unusual sadness, discouragement and loneliness  · Talk of wanting to die  · Neglect of personal appearance   · Rebelliousness- reckless behavior  · Withdrawal from people/activities they love  · Confusion- inability to concentrate     If you or a loved one observes any of these behaviors or has concerns about self-harm, here's what you can do:  · Talk about it- your feelings and reasons for harming yourself  · Remove any means that you might use to hurt yourself (examples: pills, rope, extension cords, firearm)  · Get professional help from the community (Mental Health, Substance Abuse, psychological counseling)  · Do not be alone:Call your Safe Contact- someone whom you trust who will be there for you.  · Call your local CRISIS HOTLINE 974-4271 or 538-359-0362  · Call your local Children's Mobile Crisis Response Team [place] (638) 032-0868 or www."[employer]  · Call the toll free National Suicide Prevention Hotlines   · National Suicide Prevention Lifeline 868-080-IXDO  (5846)  · [facility] 800-SUICIDE (318-3216)    DISCHARGE SURVEY:  Thank you for choosing [facility].  We hope we provided you with very good care.  You may be receiving a survey in the mail.  Please fill it out.  Your opinion is valuable to us.    ADDITIONAL EDUCATIONAL MATERIALS GIVEN TO PATIENT:        My signature on this form indicates that:  1.  I have reviewed and understand the above information  2.  My questions regarding this information have been answered to my satisfaction.  3.  I have formulated a plan with my discharge nurse to obtain my prescribed medication for home.

## 2018-12-17 NOTE — PROGRESS NOTES
Assessment complete. VSS- Fundus firm, lochia light. Patient ambulating and voiding without difficulty. POC discussed at bedside. Feeding plan discussed; patient is using electric breast pump and infant is in NICU. Settings: cpm 80-60%, suction 10-15% to comfort, for 15 minutes. No rubbing noted inside flange during pumping. Patient would like to call if she would like pain medications throughout the night. Call light within reach.

## 2018-12-17 NOTE — DISCHARGE SUMMARY
DATE OF ADMISSION:  12/14/2018    DATE OF DISCHARGE:  12/17/2018    PRINCIPAL DIAGNOSES:  1.  Intrauterine pregnancy at 40+ weeks' gestation.  2.  Chorioamnionitis.  3.  Prolonged rupture of membranes.    PRINCIPAL PROCEDURES:  1.  Intrauterine pregnancy at 40+ weeks' gestation.  2.  Post-term.  3.  Active labor.  4.  IV antibiotics for chorioamnionitis.  5.  Normal spontaneous vaginal delivery.    HOSPITAL COURSE:  The patient arrived with spontaneous rupture of membranes,   but was not in active labor.  She eventually agreed to augmentation after no   change for several hours and contractions were really spaced apart.  She has   probably got into labor and then went to complete.  She did push and deliver a   female infant; Apgars given were 1, 4 and 8.  She had thick meconium and a   rapid was called and baby ended up being admitted to the NICU for meconium.    Her weight was 6 pounds 3.3 ounces.  Baby is still in the NICU postpartum day   #3, but doing well.  She really was on IV antibiotics for chorioamnionitis for   24 hours post-delivery.  Yesterday morning, antibiotics were stopped.  She   was watched for additional 24 hours to make sure her white blood cell count   had come down as well as to inspect a fever off the antibiotics.  She is now   postpartum day #3 and doing well.  She will be discharged home today.  She   will follow up with me in 6 weeks, sooner if needed.  Verbal instructions   given.  Pelvic rest, no heavy lifting, pulling, and pushing.  She received   prenatal vitamins.  Her postoperative hematocrit was 30.1.  She is AB positive   and rubella was immune.  Patient is only taking Motrin for pain and will take   over-the-counter.  Verbal instructions given for 600 mg every 6 hours as   needed with food.       ____________________________________     MD SYBIL KEARNS / BRADLEY    DD:  12/17/2018 07:24:30  DT:  12/17/2018 07:58:41    D#:  [id number]  Job#:  [id number]

## 2018-12-21 ENCOUNTER — HOSPITAL ENCOUNTER (OUTPATIENT)
Dept: LACTATION | Facility: MEDICAL CENTER | Age: 23
End: 2018-12-21

## 2018-12-22 NOTE — LACTATION NOTE
12/14/2018: 7# 3.4oz   12/19/2018: 7# 2oz   12/21/2018: 7# 6.8oz (Intake: 8mls L / 6mls R Pumped: 10mls Total)    Concern: Overall supply, baby sleepy at the breast    Dottie was discharged from the NICU on Tuesday, 12/18/2018 with the plan to breastfeed for 10 minutes then have a bottle. Tracy was instructed to feed her as much as she would take. On average she is taking 45-60mls of pumped milk/formula.     Today we latched without the nipple shield first. Dottie was very disorganized and had a difficult time staying latched to the breast. We introduced a 24mm nipple shield which she latched to easily. Within a few minutes she was panting and taking long breaks to catch her breath, her color did not change. She removed 14mls between both breast, roughly 10 minutes on each side. Tracy pumped with the HGP for 15 minutes using the LaVie to massage the breast, yielding 10mls total. We decided to switch her from the Spectra S2 to the Ameda Platinum to maximize milk production.     Plan:   Feed 8-10 times every 24 hours.  - Offer the breast for a total of 10 minutes MAX, unless Rylyjeni because visibly or audibly tired before then. You can do one breast for 10 minutes or split the time between both breasts.   - Follow every feeding with 45-60mls of pumped milk/formula  • Use the paced bottle feeding method    Pump 8x every 24 hours  - Double pump for 10-12 minutes with the hospital grade pump  - Always pump after breastfeeding or in place of breastfeeding. Always for 10-12 minutes whether she took the breast or not.  - Settings:   • Speed: Start at 80 then turn down to 60 at 1.5-2 minutes  • Suction: To comfort, average is 25-35%, it’s ok to go higher as long as you are comfortable    Follow Up: Wednesday, December 26th at 9am

## 2018-12-26 ENCOUNTER — HOSPITAL ENCOUNTER (OUTPATIENT)
Dept: LACTATION | Facility: MEDICAL CENTER | Age: 23
End: 2018-12-26

## 2018-12-26 NOTE — LACTATION NOTE
"12/14/2018: 7# 3.4oz   12/19/2018: 7# 2oz   12/21/2018: 7# 6.8oz (Intake: 8mls L / 6mls R Pumped: 10mls Total)  12/26/2018: 7#15.0oz    Concern: Next steps in establishing breastfeeding    History: Mom has heroically established milk supply despite her own PP recovery with an infection and Dottie's stay in NICU.  She stopped formula 2 days ago.  She has taught Dottie to sustain at the breast, the NS being a bridge. Dottie loses a lot of milk when she breastfeeds and sometimes at the bottle.  She is being paced fed and can finish a bottle in 2-3 minutes even being paced.  She has a history of hiccups in utero and now.  She rarely spits up. She is at the breast most feedings, more often staying total 10-15 minutes total.  Topped off with bottle each feeding 20-40ml.  All pumped milk is being used in 24 hours.     Assessment: On suck exam her gag is forward, she does not take the finger or shield in her mouth deeply initially.  She can be encouraged to.  Her tongue extends and lifts fully.  Cupping is not consistent.  Pulling the lip back creates a loss of suction.  Some chewing on the finger but more not using the tongue fully.  Preference at 3 weeks to look to her right.  Allowed to turn to left but not comfortable.  Baby to bare breast to evaluate, no sustained sucking, no protest, willing to attempt.  Latch reviewed on bare breast.  Using 24mm nipple shield and latches more breast to baby, Dottie sustained at the breast and removed 21ml of 40 available to her, about 50%.  No interest or protest in returning to the breast, same on the right breast. Mom pumped about 40ml total.  Available was 61ml.    Plan:  Continue progress making given good weight gain and moms intuition reading Dottie well  Drop one pump at the end of ONE \"good\" feeding and feed earlier next feed to make more available for Dottie.  Use pacifier as a tug of war to strengthen tongue to stay cupped at the breast.  Hospital grade pump still " indicated.  Follow up next week to 10 days to re-evaluate progress.  Consider Pediatric PT to address neck preference if continues past 4 weeks.  Follow weight weekly at group for adjustments to plan

## 2019-01-03 ENCOUNTER — HOSPITAL ENCOUNTER (OUTPATIENT)
Dept: LACTATION | Facility: MEDICAL CENTER | Age: 24
End: 2019-01-03

## 2019-01-03 NOTE — LACTATION NOTE
12/14/2018: 7# 3.4oz   12/19/2018: 7# 2oz   12/21/2018: 7# 6.8oz (Intake: 8mls L / 6mls R Pumped: 10mls Total)  12/26/2018: 7#15.0oz  01/03/2019: 8#10.8 Wet diaper     CC: Follow up lactation consult, infant growth evaluation and plan adjustment     History since 12/26: Off formula, exclusive breastmilk with an additional volume of 20oz in the freezer pumping after feedings 7x/24 hours. Dottie is on the breast with the nipple shield cluster feeding in the mornings and then every 3 hours from 5pm - 8am.  One breast 20 minutes, sleepy for other, pumps and if baby not asleep offers 20-40ml of pumped milk.  Mom is feeling better physically.     Assessment: Mom comfortable with latch with nipple shield, Dottie not really interested in feeding at this time although she fed 3 hours ago.  Changed and dressed and still shows no interest.  Discussed routine.      Plan:  Routine of large supply facilitating Dotties oral challenges, need to keep supply high for now to facilitate this compensation.    May drop one more pumping.   May use personal pump to evaluate need for hospital grade pump.   Breastfeeding: Would offer second breast each feeding as Dottie gets sleepy on the first at about 1-0-12 minutes, changing her diaper in between and hoping she finishes on the second breast. Use the Haakaa if leaking on the breast not being fed from. Double pump at the end of feeding for next feeding (if needed)  Follow up 1:1 as needed and or group on Tues/Thursday at 11am and Monday at 530.

## 2019-07-30 ENCOUNTER — HOSPITAL ENCOUNTER (OUTPATIENT)
Dept: LAB | Facility: MEDICAL CENTER | Age: 24
End: 2019-07-30
Payer: COMMERCIAL

## 2019-07-30 LAB
BDY FAT % MEASURED: 27.5 %
BP DIAS: 71 MMHG
BP SYS: 116 MMHG
CHOLEST SERPL-MCNC: 159 MG/DL (ref 100–199)
DIABETES HTDIA: NO
EVENT NAME HTEVT: NORMAL
FASTING HTFAS: YES
GLUCOSE SERPL-MCNC: 86 MG/DL (ref 65–99)
HDLC SERPL-MCNC: 65 MG/DL
HYPERTENSION HTHYP: NO
LDLC SERPL CALC-MCNC: 86 MG/DL
SCREENING LOC CITY HTCIT: NORMAL
SCREENING LOC STATE HTSTA: NORMAL
SCREENING LOCATION HTLOC: NORMAL
SMOKING HTSMO: NO
SUBSCRIBER ID HTSID: NORMAL
TRIGL SERPL-MCNC: 41 MG/DL (ref 0–149)

## 2019-07-30 PROCEDURE — 82947 ASSAY GLUCOSE BLOOD QUANT: CPT

## 2019-07-30 PROCEDURE — S5190 WELLNESS ASSESSMENT BY NONPH: HCPCS

## 2019-07-30 PROCEDURE — 36415 COLL VENOUS BLD VENIPUNCTURE: CPT

## 2019-07-30 PROCEDURE — 80061 LIPID PANEL: CPT

## 2019-09-05 ENCOUNTER — OFFICE VISIT (OUTPATIENT)
Dept: MEDICAL GROUP | Facility: MEDICAL CENTER | Age: 24
End: 2019-09-05
Payer: COMMERCIAL

## 2019-09-05 VITALS
OXYGEN SATURATION: 96 % | SYSTOLIC BLOOD PRESSURE: 100 MMHG | WEIGHT: 126.98 LBS | TEMPERATURE: 98.7 F | DIASTOLIC BLOOD PRESSURE: 60 MMHG | HEART RATE: 82 BPM | HEIGHT: 60 IN | BODY MASS INDEX: 24.93 KG/M2

## 2019-09-05 DIAGNOSIS — N83.202 LEFT OVARIAN CYST: ICD-10-CM

## 2019-09-05 DIAGNOSIS — Z00.00 HEALTH CARE MAINTENANCE: ICD-10-CM

## 2019-09-05 DIAGNOSIS — Z00.00 ANNUAL PHYSICAL EXAM: ICD-10-CM

## 2019-09-05 PROCEDURE — 99395 PREV VISIT EST AGE 18-39: CPT | Performed by: INTERNAL MEDICINE

## 2019-09-05 NOTE — PROGRESS NOTES
CHIEF COMPLAINT  Chief Complaint   Patient presents with   • Establish Care     New 2 U   Annual    HPI  Tracy Koenig is a 23 y.o. female who presents today for the following     Pt has GYN provider: yes    Recommendations:  Regular exercise at least 4 days a week  Diet: advised balanced diet  Dental exam at least 1-2 times per year  Sunscreen use: advised    Immunizations:  TdaP:  10/2018  Influenza: Unavailable    GYN  Previous PAP:   2019, normal   Abnormal PAP: no    Menses every month with bleeding for up to 5 days  No excess cramping or bleeding.   Takes OTC analgesics for cramping  Contraception: none    Left ovarian cyst  The patient has left simple ovarian cyst, follow-up by GYN.    Reviewed PMH, PSH, FH, SH, ALL, HCM/IMM, no changes  Reviewed MEDS, no changes    Patient Active Problem List    Diagnosis Date Noted   • Annual physical exam 09/05/2019   • Health care maintenance 09/05/2019   • Left ovarian cyst 09/05/2019     CURRENT MEDICATIONS  Current Outpatient Medications   Medication Sig Dispense Refill   • Prenatal Vit-Fe Fumarate-FA (PRENATAL PO) Take  by mouth.     • Ferrous Gluconate (IRON 27 PO) Take  by mouth.       No current facility-administered medications for this visit.      ALLERGIES  Allergies: Patient has no known allergies.  PAST MEDICAL HISTORY  Past Medical History:   Diagnosis Date   • Environmental allergies    • ORAL CONTRACEPTION EVAL PRIO TO METHOE W/WO INITIATION    • Oral contraceptive use      SURGICAL HISTORY  She  has no past surgical history on file.  SOCIAL HISTORY  Social History     Tobacco Use   • Smoking status: Never Smoker   • Smokeless tobacco: Never Used   • Tobacco comment: avoid all tobacco products   Substance Use Topics   • Alcohol use: Yes     Alcohol/week: 0.0 oz     Comment: twice a month   • Drug use: No     Social History     Social History Narrative   • Not on file     FAMILY HISTORY  Family History   Problem Relation Age of Onset   • Arthritis  Mother    • Heart Disease Maternal Grandfather    • Diabetes Neg Hx    • Hypertension Neg Hx    • Hyperlipidemia Neg Hx    • Stroke Neg Hx    • Cancer Neg Hx      Family Status   Relation Name Status   • Mo  Alive   • Fa  Alive   • MGFa  (Not Specified)   • Neg Hx  (Not Specified)     ROS   Constitutional: Negative for fever, chills, fatigue.  HENT: Negative for congestion, sore throat.  Eyes: Negative for vision problems.   Respiratory: Negative for cough, shortness of breath.  Cardiovascular: Negative for chest pain, palpitations.   Gastrointestinal: Negative for heartburn, nausea, abdominal pain.   Genitourinary: Negative for dysuria.  Musculoskeletal: Negative for significant myalgia, back and joint pain.   Skin: Negative for rash.   Neuro: Negative for dizziness, weakness and headaches.   Endo/Heme/Allergies: Does not bruise/bleed easily.   Psychiatric/Behavioral: Negative for depression.    PHYSICAL EXAM   Blood Pressure 100/60   Pulse 82   Temperature 37.1 °C (98.7 °F)   Height 1.524 m (5')   Weight 57.6 kg (126 lb 15.8 oz)   Oxygen Saturation 96%  Body mass index is 24.8 kg/m².  General:  NAD, well appearing  HEENT:   NC/AT, PERRLA, EOMI, TMs are clear. Oropharyngeal mucosa is pink,  without lesions;  no cervical / supraclavicular  lymphadenopathy, no thyromegaly.    Cardiovascular: RRR.   No m/r/g.       Lungs:   CTAB, no w/r/r, no respiratory distress.  Abdomen: Soft, NT/ND; no hepatosplenomegaly.  Extremities:  2+ DP and radial pulses bilaterally.  No c/c/e.   Skin:  Warm, dry.  No erythema. No rash.   Neurologic: Alert & oriented x 3. CN II-XII grossly intact. No focal deficits.  Psychiatric:  Affect normal, mood normal, judgment normal.    Labs     Labs are reviewed and discussed with a patient  Lab Results   Component Value Date/Time    CHOLSTRLTOT 159 07/30/2019 09:20 AM    LDL 86 07/30/2019 09:20 AM    HDL 65 07/30/2019 09:20 AM    TRIGLYCERIDE 41 07/30/2019 09:20 AM       Lab Results    Component Value Date/Time    SODIUM 138 11/14/2016 09:03 AM    POTASSIUM 4.2 11/14/2016 09:03 AM    CHLORIDE 105 11/14/2016 09:03 AM    CO2 26 11/14/2016 09:03 AM    GLUCOSE 86 07/30/2019 09:20 AM    BUN 9 11/14/2016 09:03 AM    CREATININE 0.60 11/14/2016 09:03 AM     Lab Results   Component Value Date/Time    ALKPHOSPHAT 52 11/14/2016 09:03 AM    ASTSGOT 17 11/14/2016 09:03 AM    ALTSGPT 11 11/14/2016 09:03 AM    TBILIRUBIN 0.4 11/14/2016 09:03 AM      Lab Results   Component Value Date/Time    WBC 17.9 (H) 12/16/2018 03:54 AM    RBC 3.18 (L) 12/16/2018 03:54 AM    HEMOGLOBIN 10.1 (L) 12/16/2018 03:54 AM    HEMATOCRIT 30.1 (L) 12/16/2018 03:54 AM    MCV 94.7 12/16/2018 03:54 AM    MCH 31.8 12/16/2018 03:54 AM    MCHC 33.6 12/16/2018 03:54 AM    MPV 10.5 12/16/2018 03:54 AM    NEUTSPOLYS 80.80 (H) 12/16/2018 03:54 AM    LYMPHOCYTES 12.00 (L) 12/16/2018 03:54 AM    MONOCYTES 5.20 12/16/2018 03:54 AM    EOSINOPHILS 0.60 12/16/2018 03:54 AM    BASOPHILS 0.30 12/16/2018 03:54 AM      Imaging     None    Assessment and Plan     Tracy Koenig is a 23 y.o. female    1. Annual physical exam  Reviewed PMH, PSH, FH, SH, ALL, MEDS, HCM/IMM.   Advised healthy habits, diet, exercise.    2. Health care maintenance  Per HPI    3. Left ovarian cyst  Asymptomatic, follow up by GYN    Counseling:   - Smoking:  Nonsmoker    Followup: in 1 year and prn    All questions are answered.    Please note that this dictation was created using voice recognition software, and that there might be errors of hoa and possibly content.

## 2019-09-24 ENCOUNTER — IMMUNIZATION (OUTPATIENT)
Dept: OCCUPATIONAL MEDICINE | Facility: CLINIC | Age: 24
End: 2019-09-24

## 2019-09-24 DIAGNOSIS — Z23 NEED FOR VACCINATION: ICD-10-CM

## 2019-09-24 PROCEDURE — 90686 IIV4 VACC NO PRSV 0.5 ML IM: CPT | Performed by: PREVENTIVE MEDICINE

## 2020-09-21 ENCOUNTER — IMMUNIZATION (OUTPATIENT)
Dept: OCCUPATIONAL MEDICINE | Facility: CLINIC | Age: 25
End: 2020-09-21

## 2020-09-21 DIAGNOSIS — Z23 NEED FOR VACCINATION: ICD-10-CM

## 2020-09-22 PROCEDURE — 90686 IIV4 VACC NO PRSV 0.5 ML IM: CPT | Performed by: PREVENTIVE MEDICINE

## 2020-12-20 DIAGNOSIS — Z23 NEED FOR VACCINATION: ICD-10-CM

## 2021-07-22 ENCOUNTER — GYNECOLOGY VISIT (OUTPATIENT)
Dept: OBGYN | Facility: CLINIC | Age: 26
End: 2021-07-22
Payer: COMMERCIAL

## 2021-07-22 VITALS — BODY MASS INDEX: 24.22 KG/M2 | WEIGHT: 124 LBS | SYSTOLIC BLOOD PRESSURE: 119 MMHG | DIASTOLIC BLOOD PRESSURE: 66 MMHG

## 2021-07-22 DIAGNOSIS — N94.89 ADNEXAL MASS: ICD-10-CM

## 2021-07-22 DIAGNOSIS — N93.8 DUB (DYSFUNCTIONAL UTERINE BLEEDING): ICD-10-CM

## 2021-07-22 DIAGNOSIS — Z32.01 POSITIVE PREGNANCY TEST: ICD-10-CM

## 2021-07-22 LAB — IN CLINIC OB SCAN: ABNORMAL

## 2021-07-22 PROCEDURE — 76830 TRANSVAGINAL US NON-OB: CPT | Performed by: OBSTETRICS & GYNECOLOGY

## 2021-07-22 PROCEDURE — 99203 OFFICE O/P NEW LOW 30 MIN: CPT | Mod: 25 | Performed by: OBSTETRICS & GYNECOLOGY

## 2021-07-22 NOTE — PROGRESS NOTES
CC: Confirmation of Pregnancy    HPI: Pt is a  26 yo  lmp 21 who presents for evaluation of amenorrhea.  She has had no bleeding since her last menses.  A urine pregnancy test was positive.  She denies vaginal spotting or pain.  She notes nausea, but no  vomiting.  She reports history of ovarian cyst with the previous pregnancy, which was observed.  She denies pain from the cyst.    Past Medical History:   Diagnosis Date   • Environmental allergies    • ORAL CONTRACEPTION EVAL PRIO TO METHOE W/WO INITIATION    • Oral contraceptive use        History reviewed. No pertinent surgical history.      Current Outpatient Medications:   •  Prenatal Vit-Fe Fumarate-FA (PRENATAL PO), Take  by mouth., Disp: , Rfl:   •  Ferrous Gluconate (IRON 27 PO), Take  by mouth., Disp: , Rfl:     Patient has no known allergies.    Family History   Problem Relation Age of Onset   • Arthritis Mother    • Heart Disease Maternal Grandfather    • Diabetes Neg Hx    • Hypertension Neg Hx    • Hyperlipidemia Neg Hx    • Stroke Neg Hx    • Cancer Neg Hx        Social History     Socioeconomic History   • Marital status:      Spouse name: Not on file   • Number of children: Not on file   • Years of education: Not on file   • Highest education level: Not on file   Occupational History   • Not on file   Tobacco Use   • Smoking status: Never Smoker   • Smokeless tobacco: Never Used   • Tobacco comment: avoid all tobacco products   Vaping Use   • Vaping Use: Never used   Substance and Sexual Activity   • Alcohol use: Yes     Alcohol/week: 0.0 oz     Comment: twice a month   • Drug use: No   • Sexual activity: Yes   Other Topics Concern   • Not on file   Social History Narrative   • Not on file     Social Determinants of Health     Financial Resource Strain:    • Difficulty of Paying Living Expenses:    Food Insecurity:    • Worried About Running Out of Food in the Last Year:    • Ran Out of Food in the Last Year:    Transportation  Needs:    • Lack of Transportation (Medical):    • Lack of Transportation (Non-Medical):    Physical Activity:    • Days of Exercise per Week:    • Minutes of Exercise per Session:    Stress:    • Feeling of Stress :    Social Connections:    • Frequency of Communication with Friends and Family:    • Frequency of Social Gatherings with Friends and Family:    • Attends Confucianism Services:    • Active Member of Clubs or Organizations:    • Attends Club or Organization Meetings:    • Marital Status:    Intimate Partner Violence:    • Fear of Current or Ex-Partner:    • Emotionally Abused:    • Physically Abused:    • Sexually Abused:        OB History    Para Term  AB Living   2 1 1 0 0 1   SAB TAB Ectopic Molar Multiple Live Births   0 0 0 0 0 1       ROS: negative for dizziness, SOB, chest pain, palpitations, dysuria, vaginal discharge.    /66   Wt 56.2 kg (124 lb)     GENERAL: Alert, in no apparent distress  PSYCHIATRIC: Appropriate affect, intact insight and judgement.  ABDOMEN: Soft, nontender, nondistended.  No palpable masses. No hepatosplenomegaly.   No rebound or guarding.  No inguinal lymphadenopathy.  BACK: No CVA tenderness  EXTREMITIES: No edema, no calf tenderness.    GENITOURINARY:  Normal external genitalia, no lesions.  Normal urethral meatus, no masses or tenderness.  Normal bladder without fullness or masses.  Vagina well estrogenized, no vaginal discharge or lesions.  Cervix normal length, nontender.  Uterus 10 weeks, nontender.  Adnexa nontender, no masses.  Normal anus and perineum.      TRANSVAGINAL ULTRASOUND - performed and interpreted by me    Single gestational sac present.  Yolk sac presnet    Martinez intrauterine pregnancy with CRL measuring 2.90 cm, c/w 9+5/7 weeks EGA, positive fetal cardiac activity noted. FHTs 168 bpm. EDC 22.     No fluid in cul de sac.  A complex mass is noted in the posterior cul-de-sac involving the right ovary measuring 6.46 x 5.9 x 3.98  cm.  The mass appears to have layering of debris.  There is an adjacent mass measuring 3.4 x 2.0 cm, which appears to be hemorrhagic.  The left ovary appears normal.    Cervical length = 3.90 cm.      ASSESSMENT/PLAN:  1. DUB visit - Martinez IUP at 9+5/7  wks.  DAREN will be 2/19/22 by US today. Prenatal Vitamins.  F/U for NOB appointment 3 weeks.  2. Right adnexal mass - referral perinatology for recommendations regarding further characterization of mass and need for removal.

## 2021-07-22 NOTE — NON-PROVIDER
Pt here for DUB visit  # [phone] (home)   First prenatal care  Pt states no complaints  Pharmacy verified.  8w6d  LMP 5/21/21

## 2021-08-18 ENCOUNTER — PATIENT MESSAGE (OUTPATIENT)
Dept: OBGYN | Facility: CLINIC | Age: 26
End: 2021-08-18

## 2021-08-18 ENCOUNTER — INITIAL PRENATAL (OUTPATIENT)
Dept: OBGYN | Facility: CLINIC | Age: 26
End: 2021-08-18
Payer: COMMERCIAL

## 2021-08-18 ENCOUNTER — APPOINTMENT (OUTPATIENT)
Dept: OBGYN | Facility: CLINIC | Age: 26
End: 2021-08-18
Payer: COMMERCIAL

## 2021-08-18 VITALS — WEIGHT: 128 LBS | BODY MASS INDEX: 25 KG/M2 | SYSTOLIC BLOOD PRESSURE: 120 MMHG | DIASTOLIC BLOOD PRESSURE: 80 MMHG

## 2021-08-18 DIAGNOSIS — Z14.1 CYSTIC FIBROSIS CARRIER: ICD-10-CM

## 2021-08-18 DIAGNOSIS — Z34.81 ENCOUNTER FOR SUPERVISION OF OTHER NORMAL PREGNANCY IN FIRST TRIMESTER: ICD-10-CM

## 2021-08-18 PROCEDURE — 59401 PR NEW OB VISIT: CPT | Performed by: OBSTETRICS & GYNECOLOGY

## 2021-08-18 ASSESSMENT — EDINBURGH POSTNATAL DEPRESSION SCALE (EPDS)
I HAVE BLAMED MYSELF UNNECESSARILY WHEN THINGS WENT WRONG: NO, NEVER
THINGS HAVE BEEN GETTING ON TOP OF ME: NO, MOST OF THE TIME I HAVE COPED QUITE WELL
I HAVE BEEN SO UNHAPPY THAT I HAVE HAD DIFFICULTY SLEEPING: NOT AT ALL
I HAVE BEEN ANXIOUS OR WORRIED FOR NO GOOD REASON: NO, NOT AT ALL
I HAVE LOOKED FORWARD WITH ENJOYMENT TO THINGS: AS MUCH AS I EVER DID
I HAVE FELT SAD OR MISERABLE: NO, NOT AT ALL
I HAVE BEEN ABLE TO LAUGH AND SEE THE FUNNY SIDE OF THINGS: AS MUCH AS I ALWAYS COULD
THE THOUGHT OF HARMING MYSELF HAS OCCURRED TO ME: NEVER
TOTAL SCORE: 1
I HAVE BEEN SO UNHAPPY THAT I HAVE BEEN CRYING: NO, NEVER
I HAVE FELT SCARED OR PANICKY FOR NO GOOD REASON: NO, NOT AT ALL

## 2021-08-18 NOTE — NON-PROVIDER
Pt. Here for NOB visit today.  #[phone]  First prenatal care  Pt. States last pap last spring   Pharmacy verified  Chaperone offered and provided  Gave pt CAROLYN

## 2021-08-18 NOTE — PROGRESS NOTES
Cc: New OB visit    HPI:  The patient is a 25 y.o.  13w4d by 9wk US    She presents for her new obstetric visit.  Currently feels well.    Patient reports she is a cystic fibrosis carrier and has had comprehensive carrier screening.  Reports her  was tested and is not a cystic fibrosis carrier.  Varicella non-immune in last pregnancy, reports was vaccinated after that pregnancy.      ROS:  gen: denies general concerns  CV/resp: denies history of cardiac/respiratory issues.  abd: denies nausea/vomiting.  Denies abd pain.  GYN:{D denies vaginal bleeding, concerns      OB History    Para Term  AB Living   2 1 1 0 0 1   SAB TAB Ectopic Molar Multiple Live Births   0 0 0   0 1      # Outcome Date GA Lbr Antonio/2nd Weight Sex Delivery Anes PTL Lv   2 Current            1 Term 18 40w5d  3.26 kg (7 lb 3 oz)  Vag-Spont   SHAWN       GYNHx:  Regular menses  Denies hx of STIs  Denies hx of abnormal paps, last reportedly last spring    PMHx: denies    PSHx: denies    Current Outpatient Medications on File Prior to Visit   Medication Sig Dispense Refill   • Prenatal Vit-Fe Fumarate-FA (PRENATAL PO) Take  by mouth.       No current facility-administered medications on file prior to visit.       Allergies:   Allergies as of 2021   • (No Known Allergies)     Family History   Problem Relation Age of Onset   • Arthritis Mother    • Heart Disease Maternal Grandfather    • Diabetes Neg Hx    • Hypertension Neg Hx    • Hyperlipidemia Neg Hx    • Stroke Neg Hx    • Cancer Neg Hx      Social History     Tobacco Use   • Smoking status: Never Smoker   • Smokeless tobacco: Never Used   • Tobacco comment: avoid all tobacco products   Vaping Use   • Vaping Use: Never used   Substance Use Topics   • Alcohol use: Yes     Alcohol/week: 0.0 oz     Comment: twice a month   • Drug use: No         PE:    /80   Wt 58.1 kg (128 lb)   Gen: AAO, NAD  Abd: soft, NT, ND   TAUS: SIUP identified, +FM, FHTs 155  Ext:  NT, no edema      A/P:  25 y.o.  13w4d by 9wk US    1. Encounter for supervision of other normal pregnancy in first trimester  URINE CULTURE(NEW)    PRENATAL PANEL 3+HIV+UACXI    URINE DRUG SCREEN    HEP C VIRUS ANTIBODY    VARICELLA ZOSTER IGG AB    Chlamydia/GC PCR Urine Or Swab    NIPT FOR FETAL ANEUPLOIDY W/22Q11.2 MICRODELETION ([facility])   2. Cystic fibrosis carrier        - NOB labs rx given  - pt reports carrier screening with +CF carrier, neg partner screening.  - discussed aneuploidy screening which pt desires, recommend NiPT which pt desires.  Ordered, instructed to have drawn at [facility]/through [facility] for possible increased out of pocket costs at [facility].      - discussed coverage of L&D coverage/shared OB care model, physicians/CNMs in hospital.    F/U 4wks    Evy Giang MD  [facility], Women's Health

## 2021-09-07 ENCOUNTER — HOSPITAL ENCOUNTER (OUTPATIENT)
Dept: LAB | Facility: MEDICAL CENTER | Age: 26
End: 2021-09-07
Attending: OBSTETRICS & GYNECOLOGY
Payer: COMMERCIAL

## 2021-09-07 DIAGNOSIS — Z34.81 ENCOUNTER FOR SUPERVISION OF OTHER NORMAL PREGNANCY IN FIRST TRIMESTER: ICD-10-CM

## 2021-09-07 LAB
ABO GROUP BLD: NORMAL
AMPHET UR QL SCN: NEGATIVE
APPEARANCE UR: CLEAR
BARBITURATES UR QL SCN: NEGATIVE
BASOPHILS # BLD AUTO: 0.2 % (ref 0–1.8)
BASOPHILS # BLD: 0.02 K/UL (ref 0–0.12)
BENZODIAZ UR QL SCN: NEGATIVE
BILIRUB UR QL STRIP.AUTO: NEGATIVE
BLD GP AB SCN SERPL QL: NORMAL
BZE UR QL SCN: NEGATIVE
CANNABINOIDS UR QL SCN: NEGATIVE
COLOR UR: YELLOW
EOSINOPHIL # BLD AUTO: 0.1 K/UL (ref 0–0.51)
EOSINOPHIL NFR BLD: 1 % (ref 0–6.9)
ERYTHROCYTE [DISTWIDTH] IN BLOOD BY AUTOMATED COUNT: 43.1 FL (ref 35.9–50)
GLUCOSE UR STRIP.AUTO-MCNC: NEGATIVE MG/DL
HBV SURFACE AG SER QL: ABNORMAL
HCT VFR BLD AUTO: 36.3 % (ref 37–47)
HCV AB SER QL: NORMAL
HGB BLD-MCNC: 12.2 G/DL (ref 12–16)
HIV 1+2 AB+HIV1 P24 AG SERPL QL IA: NORMAL
IMM GRANULOCYTES # BLD AUTO: 0.05 K/UL (ref 0–0.11)
IMM GRANULOCYTES NFR BLD AUTO: 0.5 % (ref 0–0.9)
KETONES UR STRIP.AUTO-MCNC: NEGATIVE MG/DL
LEUKOCYTE ESTERASE UR QL STRIP.AUTO: NEGATIVE
LYMPHOCYTES # BLD AUTO: 1.57 K/UL (ref 1–4.8)
LYMPHOCYTES NFR BLD: 15.5 % (ref 22–41)
MCH RBC QN AUTO: 32.1 PG (ref 27–33)
MCHC RBC AUTO-ENTMCNC: 33.6 G/DL (ref 33.6–35)
MCV RBC AUTO: 95.5 FL (ref 81.4–97.8)
METHADONE UR QL SCN: NEGATIVE
MICRO URNS: ABNORMAL
MONOCYTES # BLD AUTO: 0.37 K/UL (ref 0–0.85)
MONOCYTES NFR BLD AUTO: 3.7 % (ref 0–13.4)
NEUTROPHILS # BLD AUTO: 8.01 K/UL (ref 2–7.15)
NEUTROPHILS NFR BLD: 79.1 % (ref 44–72)
NITRITE UR QL STRIP.AUTO: NEGATIVE
NRBC # BLD AUTO: 0 K/UL
NRBC BLD-RTO: 0 /100 WBC
OPIATES UR QL SCN: NEGATIVE
OXYCODONE UR QL SCN: NEGATIVE
PCP UR QL SCN: NEGATIVE
PH UR STRIP.AUTO: 5.5 [PH] (ref 5–8)
PLATELET # BLD AUTO: 258 K/UL (ref 164–446)
PMV BLD AUTO: 9 FL (ref 9–12.9)
PROPOXYPH UR QL SCN: NEGATIVE
PROT UR QL STRIP: NEGATIVE MG/DL
RBC # BLD AUTO: 3.8 M/UL (ref 4.2–5.4)
RBC UR QL AUTO: NEGATIVE
RH BLD: NORMAL
RUBV AB SER QL: 156 IU/ML
SP GR UR STRIP.AUTO: 1.02
TREPONEMA PALLIDUM IGG+IGM AB [PRESENCE] IN SERUM OR PLASMA BY IMMUNOASSAY: ABNORMAL
WBC # BLD AUTO: 10.1 K/UL (ref 4.8–10.8)

## 2021-09-07 PROCEDURE — 86900 BLOOD TYPING SEROLOGIC ABO: CPT

## 2021-09-07 PROCEDURE — 87491 CHLMYD TRACH DNA AMP PROBE: CPT

## 2021-09-07 PROCEDURE — 86803 HEPATITIS C AB TEST: CPT

## 2021-09-07 PROCEDURE — 80307 DRUG TEST PRSMV CHEM ANLYZR: CPT

## 2021-09-07 PROCEDURE — 81003 URINALYSIS AUTO W/O SCOPE: CPT

## 2021-09-07 PROCEDURE — 87389 HIV-1 AG W/HIV-1&-2 AB AG IA: CPT

## 2021-09-07 PROCEDURE — 81420 FETAL CHRMOML ANEUPLOIDY: CPT

## 2021-09-07 PROCEDURE — 87591 N.GONORRHOEAE DNA AMP PROB: CPT

## 2021-09-07 PROCEDURE — 87086 URINE CULTURE/COLONY COUNT: CPT

## 2021-09-07 PROCEDURE — 86787 VARICELLA-ZOSTER ANTIBODY: CPT

## 2021-09-07 PROCEDURE — 81422 FETAL CHRMOML MICRODELTJ: CPT

## 2021-09-07 PROCEDURE — 86901 BLOOD TYPING SEROLOGIC RH(D): CPT

## 2021-09-07 PROCEDURE — 86762 RUBELLA ANTIBODY: CPT

## 2021-09-07 PROCEDURE — 86850 RBC ANTIBODY SCREEN: CPT

## 2021-09-07 PROCEDURE — 85025 COMPLETE CBC W/AUTO DIFF WBC: CPT

## 2021-09-07 PROCEDURE — 86780 TREPONEMA PALLIDUM: CPT

## 2021-09-07 PROCEDURE — 36415 COLL VENOUS BLD VENIPUNCTURE: CPT

## 2021-09-07 PROCEDURE — 87340 HEPATITIS B SURFACE AG IA: CPT

## 2021-09-08 LAB
C TRACH DNA SPEC QL NAA+PROBE: NEGATIVE
N GONORRHOEA DNA SPEC QL NAA+PROBE: NEGATIVE
SPECIMEN SOURCE: NORMAL
VZV IGG SER IA-ACNC: 0.22

## 2021-09-10 LAB
BACTERIA UR CULT: NORMAL
SIGNIFICANT IND 70042: NORMAL
SITE SITE: NORMAL
SOURCE SOURCE: NORMAL

## 2021-09-15 LAB
22Q112 DEL SYND Q0669: NORMAL
ANNOTATION COMMENT IMP: NORMAL
EER FET ANEU 22Q Q0672: NORMAL
FET CHR X + Y ANEUP RISK PLAS.CFDNA QN: NORMAL
FET SEX US: NORMAL
FET TS 13 RISK PLAS.CFDNA QL: NORMAL
FET TS 18 RISK PLAS.CFDNA QL: NORMAL
FET TS 21 RISK PLAS.CFDNA QL: NORMAL
FETAL FRACTION Q0204: 13.6 %
GA (DAYS): 4 D
GA (WEEKS): 16 WEEKS
MAT WEIGHT IN Q0670: 60
NUMBER OF FETUSES Q0671: NORMAL
SERVICE CMNT-IMP: YES
TRIPLOIDY VAN TWIN Q0202: NORMAL

## 2021-09-24 ENCOUNTER — ROUTINE PRENATAL (OUTPATIENT)
Dept: OBGYN | Facility: CLINIC | Age: 26
End: 2021-09-24
Payer: COMMERCIAL

## 2021-09-24 VITALS — DIASTOLIC BLOOD PRESSURE: 65 MMHG | WEIGHT: 133 LBS | BODY MASS INDEX: 25.97 KG/M2 | SYSTOLIC BLOOD PRESSURE: 115 MMHG

## 2021-09-24 DIAGNOSIS — Z34.82 ENCOUNTER FOR SUPERVISION OF OTHER NORMAL PREGNANCY IN SECOND TRIMESTER: ICD-10-CM

## 2021-09-24 PROCEDURE — 90471 IMMUNIZATION ADMIN: CPT | Performed by: OBSTETRICS & GYNECOLOGY

## 2021-09-24 PROCEDURE — 90040 PR PRENATAL FOLLOW UP: CPT | Performed by: OBSTETRICS & GYNECOLOGY

## 2021-09-24 PROCEDURE — 90686 IIV4 VACC NO PRSV 0.5 ML IM: CPT | Performed by: OBSTETRICS & GYNECOLOGY

## 2021-10-12 ENCOUNTER — TELEPHONE (OUTPATIENT)
Dept: OBGYN | Facility: CLINIC | Age: 26
End: 2021-10-12

## 2021-10-12 NOTE — TELEPHONE ENCOUNTER
Annette from Dr. Suarez's office called stating pt called their office requesting to schedule an appointment but they have no referral on file. Informed Annette referral was placed in July, faxed referral, pregnancy episode and NIPT results to Dr. Suarez's office

## 2021-10-25 ENCOUNTER — HOSPITAL ENCOUNTER (OUTPATIENT)
Facility: MEDICAL CENTER | Age: 26
End: 2021-10-25
Attending: PREVENTIVE MEDICINE
Payer: COMMERCIAL

## 2021-10-25 ENCOUNTER — EH NON-PROVIDER (OUTPATIENT)
Dept: OCCUPATIONAL MEDICINE | Facility: CLINIC | Age: 26
End: 2021-10-25
Payer: COMMERCIAL

## 2021-10-25 DIAGNOSIS — Z11.59 ENCOUNTER FOR SCREENING FOR OTHER VIRAL DISEASES: ICD-10-CM

## 2021-10-25 PROCEDURE — U0003 INFECTIOUS AGENT DETECTION BY NUCLEIC ACID (DNA OR RNA); SEVERE ACUTE RESPIRATORY SYNDROME CORONAVIRUS 2 (SARS-COV-2) (CORONAVIRUS DISEASE [COVID-19]), AMPLIFIED PROBE TECHNIQUE, MAKING USE OF HIGH THROUGHPUT TECHNOLOGIES AS DESCRIBED BY CMS-2020-01-R: HCPCS | Performed by: PREVENTIVE MEDICINE

## 2021-10-26 DIAGNOSIS — Z11.59 ENCOUNTER FOR SCREENING FOR OTHER VIRAL DISEASES: ICD-10-CM

## 2021-10-26 LAB
COVID ORDER STATUS COVID19: NORMAL
SARS-COV-2 RNA RESP QL NAA+PROBE: DETECTED
SPECIMEN SOURCE: ABNORMAL

## 2021-11-24 ENCOUNTER — ROUTINE PRENATAL (OUTPATIENT)
Dept: OBGYN | Facility: CLINIC | Age: 26
End: 2021-11-24
Payer: COMMERCIAL

## 2021-11-24 VITALS — SYSTOLIC BLOOD PRESSURE: 122 MMHG | DIASTOLIC BLOOD PRESSURE: 71 MMHG | WEIGHT: 137.2 LBS | BODY MASS INDEX: 26.8 KG/M2

## 2021-11-24 DIAGNOSIS — Z34.92 SECOND TRIMESTER PREGNANCY: ICD-10-CM

## 2021-11-24 PROCEDURE — 90040 PR PRENATAL FOLLOW UP: CPT | Performed by: OBSTETRICS & GYNECOLOGY

## 2021-11-24 PROCEDURE — 90471 IMMUNIZATION ADMIN: CPT | Performed by: OBSTETRICS & GYNECOLOGY

## 2021-11-24 PROCEDURE — 90715 TDAP VACCINE 7 YRS/> IM: CPT | Performed by: OBSTETRICS & GYNECOLOGY

## 2021-11-24 NOTE — PROGRESS NOTES
MARYAM sheet given and explained to Pt.  Tdap vaccine given today. Left Deltoid. VIS given and screening check list reviewed with pt.  Tdap vaccine verified by Vanda Charlton MA.

## 2021-11-24 NOTE — LETTER
"Count Your Baby's Movements  Another step to a healthy delivery    Tracy Koenig            Dept: [phone]    How Many Weeks Pregnant:27w4d    Date to Begin Countin2021              How to use this chart    One way for your physician to keep track of your baby's health is by knowing how often the baby moves (or \"kicks\") in your womb.  You can help your physician to do this by using this chart every day.    Every day, you should see how many hours it takes for your baby to move 10 times.  Start in the morning, as soon as you get up.    · First, write down the time your baby moves until you get to 10.  · Check off one box every time your baby moves until you get to 10.  · Write down the time you finished counting in the last column.  · Total how long it took to count up all 10 movements.  · Finally, fill in the box that shows how long this took.  After counting 10 movements, you no longer have to count any more that day.  The next morning, just start counting again as soon as you get up.    What should you call a \"movement\"?  It is hard to say, because it will feel different from one mother to another and from one pregnancy to the next.  The important thing is that you count the movements the same way throughout your pregnancy.  If you have more questions, you should ask your physician.    Count carefully every day!  SAMPLE:  Week 28    How many hours did it take to feel 10 movements?       Start  Time     1     2     3     4     5     6     7     8     9     10   Finish Time   Mon 8:20 ·  ·  ·  ·  ·  ·  ·  ·  ·  ·  11:40                  Sat               Sun                 IMPORTANT: You should contact your physician if it takes more than two hours for you to feel 10 movements.  Each morning, write down the time and start to count the movements of your baby.  Keep track by checking off one box every time you feel one movement.  When you have " "felt 10 \"kicks\", write down the time you finished counting in the last column.  Then fill in the   box (over the check keon) for the number of hours it took.  Be sure to read the complete instructions on the previous page.            "

## 2021-12-14 ENCOUNTER — HOSPITAL ENCOUNTER (OUTPATIENT)
Dept: LAB | Facility: MEDICAL CENTER | Age: 26
End: 2021-12-14
Attending: OBSTETRICS & GYNECOLOGY
Payer: COMMERCIAL

## 2021-12-14 DIAGNOSIS — Z34.92 SECOND TRIMESTER PREGNANCY: ICD-10-CM

## 2021-12-14 LAB
BASOPHILS # BLD AUTO: 0.6 % (ref 0–1.8)
BASOPHILS # BLD: 0.05 K/UL (ref 0–0.12)
EOSINOPHIL # BLD AUTO: 0.08 K/UL (ref 0–0.51)
EOSINOPHIL NFR BLD: 0.9 % (ref 0–6.9)
ERYTHROCYTE [DISTWIDTH] IN BLOOD BY AUTOMATED COUNT: 45.9 FL (ref 35.9–50)
GLUCOSE 1H P 50 G GLC PO SERPL-MCNC: 46 MG/DL (ref 70–139)
HCT VFR BLD AUTO: 34.6 % (ref 37–47)
HGB BLD-MCNC: 11 G/DL (ref 12–16)
IMM GRANULOCYTES # BLD AUTO: 0.05 K/UL (ref 0–0.11)
IMM GRANULOCYTES NFR BLD AUTO: 0.6 % (ref 0–0.9)
LYMPHOCYTES # BLD AUTO: 1.56 K/UL (ref 1–4.8)
LYMPHOCYTES NFR BLD: 18.4 % (ref 22–41)
MCH RBC QN AUTO: 30.8 PG (ref 27–33)
MCHC RBC AUTO-ENTMCNC: 31.8 G/DL (ref 33.6–35)
MCV RBC AUTO: 96.9 FL (ref 81.4–97.8)
MONOCYTES # BLD AUTO: 0.46 K/UL (ref 0–0.85)
MONOCYTES NFR BLD AUTO: 5.4 % (ref 0–13.4)
NEUTROPHILS # BLD AUTO: 6.3 K/UL (ref 2–7.15)
NEUTROPHILS NFR BLD: 74.1 % (ref 44–72)
NRBC # BLD AUTO: 0 K/UL
NRBC BLD-RTO: 0 /100 WBC
PLATELET # BLD AUTO: 246 K/UL (ref 164–446)
PMV BLD AUTO: 9.3 FL (ref 9–12.9)
RBC # BLD AUTO: 3.57 M/UL (ref 4.2–5.4)
TREPONEMA PALLIDUM IGG+IGM AB [PRESENCE] IN SERUM OR PLASMA BY IMMUNOASSAY: NORMAL
WBC # BLD AUTO: 8.5 K/UL (ref 4.8–10.8)

## 2021-12-14 PROCEDURE — 85025 COMPLETE CBC W/AUTO DIFF WBC: CPT

## 2021-12-14 PROCEDURE — 86780 TREPONEMA PALLIDUM: CPT

## 2021-12-14 PROCEDURE — 82950 GLUCOSE TEST: CPT

## 2021-12-14 PROCEDURE — 36415 COLL VENOUS BLD VENIPUNCTURE: CPT

## 2021-12-16 ENCOUNTER — ROUTINE PRENATAL (OUTPATIENT)
Dept: OBGYN | Facility: CLINIC | Age: 26
End: 2021-12-16
Payer: COMMERCIAL

## 2021-12-16 VITALS
DIASTOLIC BLOOD PRESSURE: 76 MMHG | WEIGHT: 143 LBS | HEIGHT: 60 IN | BODY MASS INDEX: 28.07 KG/M2 | SYSTOLIC BLOOD PRESSURE: 116 MMHG

## 2021-12-16 DIAGNOSIS — Z34.93 THIRD TRIMESTER PREGNANCY: ICD-10-CM

## 2021-12-16 DIAGNOSIS — N83.202 LEFT OVARIAN CYST: ICD-10-CM

## 2021-12-16 PROCEDURE — 90040 PR PRENATAL FOLLOW UP: CPT | Performed by: OBSTETRICS & GYNECOLOGY

## 2021-12-29 ENCOUNTER — TELEPHONE (OUTPATIENT)
Dept: OBGYN | Facility: CLINIC | Age: 26
End: 2021-12-29

## 2021-12-29 NOTE — TELEPHONE ENCOUNTER
PT called stating she slipped on the ice, fell on her butt. Denies any vaginal bleeding, loss of fluids, cramping, or discharge, also denies hitting her belly. I informed PT to just monitor babies movents an ensure baby is meeting luis sheet and if any of the symptoms begin she should go to labor and delivery to be evaulated. PT verbalized understanding.

## 2022-01-04 ENCOUNTER — ROUTINE PRENATAL (OUTPATIENT)
Dept: OBGYN | Facility: CLINIC | Age: 27
End: 2022-01-04
Payer: COMMERCIAL

## 2022-01-04 VITALS — SYSTOLIC BLOOD PRESSURE: 120 MMHG | WEIGHT: 143.7 LBS | DIASTOLIC BLOOD PRESSURE: 69 MMHG | BODY MASS INDEX: 28.06 KG/M2

## 2022-01-04 DIAGNOSIS — O36.8390 ABNORMAL FETAL HEART RATE AFFECTING PREGNANCY: ICD-10-CM

## 2022-01-04 DIAGNOSIS — Z34.93 THIRD TRIMESTER PREGNANCY: ICD-10-CM

## 2022-01-04 LAB
NST ACOUSTIC STIMULATION: NORMAL
NST ACTION NECESSARY: NORMAL
NST ASSESSMENT: NORMAL
NST BASELINE: NORMAL
NST INDICATIONS: NORMAL
NST OTHER DATA: NORMAL
NST READ BY: NORMAL
NST RETURN: NORMAL
NST UTERINE ACTIVITY: NORMAL

## 2022-01-04 PROCEDURE — 90040 PR PRENATAL FOLLOW UP: CPT | Mod: 25 | Performed by: OBSTETRICS & GYNECOLOGY

## 2022-01-04 PROCEDURE — 59025 FETAL NON-STRESS TEST: CPT | Performed by: OBSTETRICS & GYNECOLOGY

## 2022-01-04 NOTE — PROCEDURES
NST: Baseline 130 with moderate variability, accelerations present, no decelerations noted.  Reactive NST.  Indication abnormal fetal heart tones noted during Doppler assessment.

## 2022-01-04 NOTE — PROGRESS NOTES
Auditory decel heard while doppling heart tones so NST performed. See procedure note for details. RNST.

## 2022-01-10 ENCOUNTER — TELEPHONE (OUTPATIENT)
Dept: OBGYN | Facility: CLINIC | Age: 27
End: 2022-01-10

## 2022-01-10 NOTE — TELEPHONE ENCOUNTER
Patient called c/o an excess of vaginal discharge. Asked patient if the discharge had an odor or was causing any vaginal itching. Patient stated no, and no cramping or contractions at this time. Relayed to patient vaginal discharge is normal during pregnancy. Patient verbalized understanding and had no further questions at this time.

## 2022-01-13 ENCOUNTER — HOSPITAL ENCOUNTER (EMERGENCY)
Facility: MEDICAL CENTER | Age: 27
End: 2022-01-13
Attending: OBSTETRICS & GYNECOLOGY | Admitting: OBSTETRICS & GYNECOLOGY
Payer: COMMERCIAL

## 2022-01-13 VITALS — HEIGHT: 60 IN | WEIGHT: 143 LBS | BODY MASS INDEX: 28.07 KG/M2

## 2022-01-13 PROCEDURE — 99281 EMR DPT VST MAYX REQ PHY/QHP: CPT | Mod: 25 | Performed by: PHYSICIAN ASSISTANT

## 2022-01-13 PROCEDURE — 59025 FETAL NON-STRESS TEST: CPT | Mod: 26 | Performed by: PHYSICIAN ASSISTANT

## 2022-01-13 PROCEDURE — 302449 STATCHG TRIAGE ONLY (STATISTIC)

## 2022-01-13 PROCEDURE — 59025 FETAL NON-STRESS TEST: CPT

## 2022-01-14 NOTE — PROGRESS NOTES
) Report received from Darci CONTRERAS RN, POC discussed, assumed care of patient at this time  ) Dr. Lao at bedside to see patient  ) Report to LANA HERRERA in department, provider reviewed FHT monitoring strip, orders received to discharge patient with labor precautions  ) Discharge instructions given including  labor precautions, UC's, ROM, VB, abn FM, when to return to L&D, f/u with Dr. Mccauley on , pelvic rest and modified bedrest. Questions answered at length. Pt verbalized understanding and agreement with POC and discharge. Discharge instructions signed.   ) Patient ambulated out of unit in stable condition

## 2022-01-14 NOTE — ED PROVIDER NOTES
Emergency Obstetric Consultation     Date of Service  2022    Reason for Consultation  No chief complaint on file.      History of Presenting Illness  26 y.o. female who presented 2022 with History.    Review of Systems  Review of Systems   All other systems reviewed and are negative.      Obstetric History    OB History    Para Term  AB Living   2 1 1 0 0 1   SAB IAB Ectopic Molar Multiple Live Births   0 0 0   0 1      # Outcome Date GA Lbr Antonio/2nd Weight Sex Delivery Anes PTL Lv   2 Current            1 Term 18 40w5d  3.26 kg (7 lb 3 oz)  Vag-Spont   SHAWN       Gynecologic History  Patient's last menstrual period was 2021.    Medical History  Past Medical History:   Diagnosis Date   • Environmental allergies    • ORAL CONTRACEPTION EVAL PRIO TO METHOE W/WO INITIATION    • Oral contraceptive use        Surgical History   has no past surgical history on file.    Family History  family history includes Arthritis in her mother; Heart Disease in her maternal grandfather.    Social History   reports that she has never smoked. She has never used smokeless tobacco. She reports current alcohol use. She reports that she does not use drugs.    Medications  Medications Prior to Admission   Medication Sig Dispense Refill Last Dose   • Prenatal Vit-Fe Fumarate-FA (PRENATAL PO) Take  by mouth.      • Ferrous Gluconate (IRON 27 PO) Take  by mouth.          Allergies  No Known Allergies    Physical Exam  Maternal Exam:  Uterine Assessment: Contraction strength is mild.  Contraction frequency is rare and irregular.   Pt here c/o UCs daily for past few weeks but only as tightening and lasting an hour, but today they have been consistent since 2pm, so ~5 hours, though denies incr strength or frequency. Denies VB, LOF. +FM.     Abdomen: Patient reports no abdominal tenderness. Fetal presentation: vertex    Introitus: Normal vulva. Normal vagina.  Ferning test: not done.   Amniotic fluid  character: not assessed.    Pelvis: adequate for delivery.    Cervix: Cl/th/high per RN examBaseline rate: 140bpm.   Variability: moderate (6-25 bpm).    NST Cat 1 per my read, one possible decel not related to a contraction (possibly when pt was sitting up) with quick return to baseline, Cat 1 strip for an hour after this.     Fetal State Assessment: Category I - tracings are normal.      Genitourinary:     General: Normal vulva.         Laboratory               No results for input(s): NTPROBNP in the last 72 hours.         Imaging  None    Assessment & Plan  Assessment:  Not in labor.   Membrane status: intact.   Fetal well-being: normal.     Plan:  IUP at 34w5d not in labor - PTL precautions reviewed. Cat 1 NST. Daily FKC recommended. F/u RWH 1-2 wk or sooner prn.           ZANDER Ambrocio.

## 2022-01-20 ENCOUNTER — ROUTINE PRENATAL (OUTPATIENT)
Dept: OBGYN | Facility: CLINIC | Age: 27
End: 2022-01-20
Payer: COMMERCIAL

## 2022-01-20 VITALS — SYSTOLIC BLOOD PRESSURE: 120 MMHG | WEIGHT: 148 LBS | BODY MASS INDEX: 28.9 KG/M2 | DIASTOLIC BLOOD PRESSURE: 77 MMHG

## 2022-01-20 DIAGNOSIS — Z34.93 THIRD TRIMESTER PREGNANCY: ICD-10-CM

## 2022-01-20 PROCEDURE — 0502F SUBSEQUENT PRENATAL CARE: CPT | Performed by: OBSTETRICS & GYNECOLOGY

## 2022-01-24 ENCOUNTER — HOSPITAL ENCOUNTER (OUTPATIENT)
Facility: MEDICAL CENTER | Age: 27
End: 2022-01-24
Attending: OBSTETRICS & GYNECOLOGY
Payer: COMMERCIAL

## 2022-01-24 ENCOUNTER — ROUTINE PRENATAL (OUTPATIENT)
Dept: OBGYN | Facility: CLINIC | Age: 27
End: 2022-01-24
Payer: COMMERCIAL

## 2022-01-24 VITALS — SYSTOLIC BLOOD PRESSURE: 132 MMHG | WEIGHT: 148.5 LBS | BODY MASS INDEX: 29 KG/M2 | DIASTOLIC BLOOD PRESSURE: 83 MMHG

## 2022-01-24 DIAGNOSIS — Z34.93 THIRD TRIMESTER PREGNANCY: ICD-10-CM

## 2022-01-24 PROCEDURE — 87150 DNA/RNA AMPLIFIED PROBE: CPT

## 2022-01-24 PROCEDURE — 87081 CULTURE SCREEN ONLY: CPT

## 2022-01-24 PROCEDURE — 0502F SUBSEQUENT PRENATAL CARE: CPT | Performed by: OBSTETRICS & GYNECOLOGY

## 2022-01-25 DIAGNOSIS — Z34.93 THIRD TRIMESTER PREGNANCY: ICD-10-CM

## 2022-01-26 LAB — GP B STREP DNA SPEC QL NAA+PROBE: NEGATIVE

## 2022-01-30 ENCOUNTER — HOSPITAL ENCOUNTER (EMERGENCY)
Facility: MEDICAL CENTER | Age: 27
End: 2022-01-30
Attending: OBSTETRICS & GYNECOLOGY | Admitting: OBSTETRICS & GYNECOLOGY
Payer: COMMERCIAL

## 2022-01-30 VITALS
SYSTOLIC BLOOD PRESSURE: 132 MMHG | HEIGHT: 60 IN | WEIGHT: 148 LBS | DIASTOLIC BLOOD PRESSURE: 72 MMHG | BODY MASS INDEX: 29.06 KG/M2 | HEART RATE: 106 BPM

## 2022-01-30 PROCEDURE — 59025 FETAL NON-STRESS TEST: CPT

## 2022-01-30 PROCEDURE — 59025 FETAL NON-STRESS TEST: CPT | Mod: 26 | Performed by: OBSTETRICS & GYNECOLOGY

## 2022-01-30 PROCEDURE — 99282 EMERGENCY DEPT VISIT SF MDM: CPT

## 2022-01-30 ASSESSMENT — PAIN SCALES - GENERAL: PAINLEVEL: 3

## 2022-01-30 NOTE — PROGRESS NOTES
1104- Pt Presents to L&D with c/o ctxs. Denies any LOF or VB. Denies any complications with this pregnancy. EFM applied, + FM. Vitals WNL. SVE checked.     1112- Report given to NICOLE North, Resident.     1154- Resident at bedside to evaluate pt.     1157- Orders received to d/c pt home with labor precautions.     1201- D/C instructions reviewed at bedside, pt verbalized understanding. All questions and concerns were answered.     1206- Pt left ambulatory in stable condition.

## 2022-01-30 NOTE — ED PROVIDER NOTES
OB ED Note:    CC: contractions    HPI:  Ms. Tracy Koenig is a 26 y.o.  @ 37w1d by LMP consistent with 9 week ultrasound presenting with contractions that started this morning and reports they were about 5-6 minutes apart and last about 2 minutes. She is no longer feeling contractions. She states she had some heartburn and nausea and had a couple of episodes of diarrhea this morning. She denies any other symptoms. She has no known sick contacts.      Contractions: Yes   Loss of fluid: No   Vaginal bleeding: No   Fetal movement: present      [facility]NC with [facility]    PNL:  Rh+, RI, HIV neg, TrepAb neg, HBsAg NR, GC/CT neg/neg  Glucola: 46  GBS -      ROS:  Const: denies fevers, general concerns  CV/resp: reports no concerns  GI: denies abd pain, GI concerns  : see HPI  Neuro: denies HA/vision changes    OB History    Para Term  AB Living   2 1 1 0 0 1   SAB IAB Ectopic Molar Multiple Live Births   0 0 0   0 1      # Outcome Date GA Lbr Antonio/2nd Weight Sex Delivery Anes PTL Lv   2 Current            1 Term 18 40w5d  3.26 kg (7 lb 3 oz)  Vag-Spont   SHAWN       GYN: denies STIs, no cervical procedures    Past Medical History:   Diagnosis Date   • Environmental allergies    • ORAL CONTRACEPTION EVAL PRIO TO METHOE W/WO INITIATION    • Oral contraceptive use        No past surgical history on file.    No current facility-administered medications on file prior to encounter.     Current Outpatient Medications on File Prior to Encounter   Medication Sig Dispense Refill   • Prenatal Vit-Fe Fumarate-FA (PRENATAL PO) Take  by mouth.     • Ferrous Gluconate (IRON 27 PO) Take  by mouth.         Family History   Problem Relation Age of Onset   • Arthritis Mother    • Heart Disease Maternal Grandfather    • Diabetes Neg Hx    • Hypertension Neg Hx    • Hyperlipidemia Neg Hx    • Stroke Neg Hx    • Cancer Neg Hx        Social History     Tobacco Use   • Smoking status: Never Smoker   • Smokeless tobacco:  Never Used   • Tobacco comment: avoid all tobacco products   Vaping Use   • Vaping Use: Never used   Substance Use Topics   • Alcohol use: Yes     Alcohol/week: 0.0 oz     Comment: twice a month   • Drug use: No         PE:  Vitals:    22 1104   BP: 136/80   Pulse: (!) 108   Weight: 67.1 kg (148 lb)   Height: 1.524 m (5')     gen: AAO, NAD  abd: soft, gravid, NT, EFW  Ext: NT, no edema    SVE: 3/80/-2 @ 1106  FHT: 150/moderate variability/+ accels/ - decels  Mary: no regular contractions    A/P: 26 y.o.  @ 37w1d by LMP consistent with 9 week ultrasound who presented with complaints of contractions. She was no longer feeling contractions in triage and was not having regular contractions on monitor. Pregnancy uncomplicated. Prenatal labs within normal. GBS negative. VS within normal limits.   -she was able to tolerate po fluids in triage without nausea  -discharge home  -ED return precautions discussed with patient including; regular contractions, vaginal bleeding, loss of fluid, decreased fetal movement          Staci North M.D.

## 2022-02-03 ENCOUNTER — ROUTINE PRENATAL (OUTPATIENT)
Dept: OBGYN | Facility: CLINIC | Age: 27
End: 2022-02-03
Payer: COMMERCIAL

## 2022-02-03 VITALS — WEIGHT: 148.1 LBS | BODY MASS INDEX: 28.92 KG/M2 | DIASTOLIC BLOOD PRESSURE: 75 MMHG | SYSTOLIC BLOOD PRESSURE: 120 MMHG

## 2022-02-03 DIAGNOSIS — Z34.93 THIRD TRIMESTER PREGNANCY: ICD-10-CM

## 2022-02-03 PROCEDURE — 0502F SUBSEQUENT PRENATAL CARE: CPT | Performed by: OBSTETRICS & GYNECOLOGY

## 2022-02-04 ENCOUNTER — HOSPITAL ENCOUNTER (EMERGENCY)
Facility: MEDICAL CENTER | Age: 27
End: 2022-02-04
Attending: OBSTETRICS & GYNECOLOGY | Admitting: OBSTETRICS & GYNECOLOGY
Payer: COMMERCIAL

## 2022-02-04 VITALS
SYSTOLIC BLOOD PRESSURE: 135 MMHG | HEIGHT: 60 IN | BODY MASS INDEX: 29.08 KG/M2 | HEART RATE: 68 BPM | DIASTOLIC BLOOD PRESSURE: 84 MMHG | TEMPERATURE: 97.4 F | WEIGHT: 148.1 LBS | OXYGEN SATURATION: 99 %

## 2022-02-04 PROCEDURE — 59025 FETAL NON-STRESS TEST: CPT | Mod: 26 | Performed by: OBSTETRICS & GYNECOLOGY

## 2022-02-04 PROCEDURE — 59025 FETAL NON-STRESS TEST: CPT

## 2022-02-04 PROCEDURE — 99282 EMERGENCY DEPT VISIT SF MDM: CPT

## 2022-02-04 ASSESSMENT — PAIN SCALES - GENERAL: PAINLEVEL: 3

## 2022-02-04 NOTE — ED PROVIDER NOTES
OB ED Note:    CC: contractions    HPI:  Ms. Tracy Koenig is a 26 y.o.  @ 37w6d by LMP consistent with 9 week ultrasound presenting today for contractions that started last night and occur about every 4 minutes. Pregnancy has been uncomplicated.     Contractions: Yes   Loss of fluid: No   Vaginal bleeding: No   Fetal movement: present      PNC with [facility]     PNL:  Rh+, RI, HIV neg, TrepAb neg, HBsAg NR, GC/CT neg/neg  Glucola: 46  GBS -      ROS:  Const: denies fevers, general concerns  CV/resp: reports no concerns  GI: denies abd pain, GI concerns  : see HPI  Neuro: denies HA/vision changes    OB History    Para Term  AB Living   2 1 1 0 0 1   SAB IAB Ectopic Molar Multiple Live Births   0 0 0   0 1      # Outcome Date GA Lbr Antonio/2nd Weight Sex Delivery Anes PTL Lv   2 Current            1 Term 18 40w5d  3.26 kg (7 lb 3 oz)  Vag-Spont   SHAWN       GYN: denies STIs, no cervical procedures    Past Medical History:   Diagnosis Date   • Environmental allergies    • ORAL CONTRACEPTION EVAL PRIO TO METHOE W/WO INITIATION    • Oral contraceptive use        No past surgical history on file.    No current facility-administered medications on file prior to encounter.     Current Outpatient Medications on File Prior to Encounter   Medication Sig Dispense Refill   • Prenatal Vit-Fe Fumarate-FA (PRENATAL PO) Take  by mouth.     • Ferrous Gluconate (IRON 27 PO) Take  by mouth.         Family History   Problem Relation Age of Onset   • Arthritis Mother    • Heart Disease Maternal Grandfather    • Diabetes Neg Hx    • Hypertension Neg Hx    • Hyperlipidemia Neg Hx    • Stroke Neg Hx    • Cancer Neg Hx        Social History     Tobacco Use   • Smoking status: Never Smoker   • Smokeless tobacco: Never Used   • Tobacco comment: avoid all tobacco products   Vaping Use   • Vaping Use: Never used   Substance Use Topics   • Alcohol use: Yes     Alcohol/week: 0.0 oz     Comment: twice a month   • Drug  use: No         PE:  Vitals:    22 1004   BP: 135/84   Pulse: 68   Temp: 36.3 °C (97.4 °F)   TempSrc: Temporal   SpO2: 99%   Weight: 67.2 kg (148 lb 1.6 oz)   Height: 1.524 m (5')     gen: AAO, NAD  abd: soft, gravid, NT  Ext: NT, no edema    SVE: 3/70/-2 @ 1010           3/70/-2 @ 1135  FHT: 130/moderate variability/+ accels/ - decels  Mary: uterine irritability, contractions every 6 minutes on short strip    A/P: 26 y.o.  @ 37w6d by LMP consistent with 9 week ultrasound presenting today for contractions that started last night and occur about every 4 minutes. GBS negative. Prenatal labs within normal. No complications during pregnancy. SVE 3/70/-2 and unchanged when rechecked one hour later.   -discharge to home  -ED return precautions discussed with patient including: regular contractions, loss of fluid, vaginal bleeding, or decreased fetal movement. Nurse discussed with patient that she may have some spotting following SVE. Patient understood and verbalized agreement to the plan.       Staci North M.D.

## 2022-02-04 NOTE — PROGRESS NOTES
1000: Pt presents to L&D for c/o UCs starting yesterday afternoon. Denies LOF or VB. External monitors applied. Reports + FM. Vitals WNL. SVE 3/70/-2.     1020: Dr. Lao given report on pt. MD ordering for pt to be rechecked in one hour.     1135: SVE performed by RN. Pt unchanged.     1140: Dr. North at bedside. MD discussed POC with pt. Pt to discharge home with labor precautions.     1155: Discharge instructions given and labor precautions reviewed with pt. All questions answered. Pt verbalized understanding.     1157: Pt discharged home in stable condition.

## 2022-02-08 ENCOUNTER — ROUTINE PRENATAL (OUTPATIENT)
Dept: OBGYN | Facility: CLINIC | Age: 27
End: 2022-02-08
Payer: COMMERCIAL

## 2022-02-08 VITALS — WEIGHT: 149.7 LBS | SYSTOLIC BLOOD PRESSURE: 129 MMHG | DIASTOLIC BLOOD PRESSURE: 81 MMHG | BODY MASS INDEX: 29.24 KG/M2

## 2022-02-08 DIAGNOSIS — Z34.93 THIRD TRIMESTER PREGNANCY: ICD-10-CM

## 2022-02-08 PROCEDURE — 0502F SUBSEQUENT PRENATAL CARE: CPT | Performed by: OBSTETRICS & GYNECOLOGY

## 2022-02-10 ENCOUNTER — HOSPITAL ENCOUNTER (INPATIENT)
Facility: MEDICAL CENTER | Age: 27
LOS: 2 days | End: 2022-02-12
Attending: OBSTETRICS & GYNECOLOGY | Admitting: OBSTETRICS & GYNECOLOGY
Payer: COMMERCIAL

## 2022-02-10 DIAGNOSIS — Z78.9 EXCLUSIVELY BREASTFEED INFANT: ICD-10-CM

## 2022-02-10 PROCEDURE — 302449 STATCHG TRIAGE ONLY (STATISTIC)

## 2022-02-10 PROCEDURE — 770002 HCHG ROOM/CARE - OB PRIVATE (112)

## 2022-02-10 RX ORDER — SODIUM CHLORIDE, SODIUM LACTATE, POTASSIUM CHLORIDE, CALCIUM CHLORIDE 600; 310; 30; 20 MG/100ML; MG/100ML; MG/100ML; MG/100ML
1000 INJECTION, SOLUTION INTRAVENOUS CONTINUOUS
Status: ACTIVE | OUTPATIENT
Start: 2022-02-11 | End: 2022-02-11

## 2022-02-10 RX ORDER — ALUMINA, MAGNESIA, AND SIMETHICONE 2400; 2400; 240 MG/30ML; MG/30ML; MG/30ML
30 SUSPENSION ORAL EVERY 6 HOURS PRN
Status: DISCONTINUED | OUTPATIENT
Start: 2022-02-10 | End: 2022-02-11 | Stop reason: HOSPADM

## 2022-02-10 RX ORDER — ONDANSETRON 2 MG/ML
4 INJECTION INTRAMUSCULAR; INTRAVENOUS ONCE
Status: DISCONTINUED | OUTPATIENT
Start: 2022-02-10 | End: 2022-02-10

## 2022-02-10 RX ORDER — OXYTOCIN 10 [USP'U]/ML
10 INJECTION, SOLUTION INTRAMUSCULAR; INTRAVENOUS
Status: DISCONTINUED | OUTPATIENT
Start: 2022-02-10 | End: 2022-02-11 | Stop reason: HOSPADM

## 2022-02-10 RX ORDER — METHYLERGONOVINE MALEATE 0.2 MG/ML
0.2 INJECTION INTRAVENOUS
Status: DISCONTINUED | OUTPATIENT
Start: 2022-02-10 | End: 2022-02-11 | Stop reason: HOSPADM

## 2022-02-10 RX ORDER — DEXTROSE, SODIUM CHLORIDE, SODIUM LACTATE, POTASSIUM CHLORIDE, AND CALCIUM CHLORIDE 5; .6; .31; .03; .02 G/100ML; G/100ML; G/100ML; G/100ML; G/100ML
INJECTION, SOLUTION INTRAVENOUS CONTINUOUS
Status: DISCONTINUED | OUTPATIENT
Start: 2022-02-11 | End: 2022-02-12

## 2022-02-10 RX ORDER — MISOPROSTOL 200 UG/1
800 TABLET ORAL
Status: DISCONTINUED | OUTPATIENT
Start: 2022-02-10 | End: 2022-02-11 | Stop reason: HOSPADM

## 2022-02-10 RX ORDER — ACETAMINOPHEN 500 MG
1000 TABLET ORAL
Status: DISCONTINUED | OUTPATIENT
Start: 2022-02-10 | End: 2022-02-11 | Stop reason: HOSPADM

## 2022-02-10 RX ORDER — TERBUTALINE SULFATE 1 MG/ML
0.25 INJECTION, SOLUTION SUBCUTANEOUS
Status: DISCONTINUED | OUTPATIENT
Start: 2022-02-10 | End: 2022-02-11 | Stop reason: HOSPADM

## 2022-02-10 RX ORDER — CARBOPROST TROMETHAMINE 250 UG/ML
250 INJECTION, SOLUTION INTRAMUSCULAR
Status: DISCONTINUED | OUTPATIENT
Start: 2022-02-10 | End: 2022-02-11 | Stop reason: HOSPADM

## 2022-02-10 RX ORDER — IBUPROFEN 800 MG/1
800 TABLET ORAL
Status: COMPLETED | OUTPATIENT
Start: 2022-02-10 | End: 2022-02-11

## 2022-02-10 RX ORDER — OXYCODONE HYDROCHLORIDE 5 MG/1
5 TABLET ORAL
Status: DISCONTINUED | OUTPATIENT
Start: 2022-02-10 | End: 2022-02-11 | Stop reason: HOSPADM

## 2022-02-11 ENCOUNTER — ANESTHESIA (OUTPATIENT)
Dept: ANESTHESIOLOGY | Facility: MEDICAL CENTER | Age: 27
End: 2022-02-11
Payer: COMMERCIAL

## 2022-02-11 ENCOUNTER — ANESTHESIA EVENT (OUTPATIENT)
Dept: ANESTHESIOLOGY | Facility: MEDICAL CENTER | Age: 27
End: 2022-02-11
Payer: COMMERCIAL

## 2022-02-11 LAB
BASOPHILS # BLD AUTO: 0.5 % (ref 0–1.8)
BASOPHILS # BLD: 0.03 K/UL (ref 0–0.12)
EOSINOPHIL # BLD AUTO: 0.07 K/UL (ref 0–0.51)
EOSINOPHIL NFR BLD: 1.1 % (ref 0–6.9)
ERYTHROCYTE [DISTWIDTH] IN BLOOD BY AUTOMATED COUNT: 46.5 FL (ref 35.9–50)
ERYTHROCYTE [DISTWIDTH] IN BLOOD BY AUTOMATED COUNT: 47.2 FL (ref 35.9–50)
HCT VFR BLD AUTO: 29.9 % (ref 37–47)
HCT VFR BLD AUTO: 32 % (ref 37–47)
HGB BLD-MCNC: 10 G/DL (ref 12–16)
HGB BLD-MCNC: 9.5 G/DL (ref 12–16)
HOLDING TUBE BB 8507: NORMAL
IMM GRANULOCYTES # BLD AUTO: 0.03 K/UL (ref 0–0.11)
IMM GRANULOCYTES NFR BLD AUTO: 0.5 % (ref 0–0.9)
LYMPHOCYTES # BLD AUTO: 1.46 K/UL (ref 1–4.8)
LYMPHOCYTES NFR BLD: 23.1 % (ref 22–41)
MCH RBC QN AUTO: 27.5 PG (ref 27–33)
MCH RBC QN AUTO: 27.7 PG (ref 27–33)
MCHC RBC AUTO-ENTMCNC: 31.3 G/DL (ref 33.6–35)
MCHC RBC AUTO-ENTMCNC: 31.8 G/DL (ref 33.6–35)
MCV RBC AUTO: 87.2 FL (ref 81.4–97.8)
MCV RBC AUTO: 88.2 FL (ref 81.4–97.8)
MONOCYTES # BLD AUTO: 0.27 K/UL (ref 0–0.85)
MONOCYTES NFR BLD AUTO: 4.3 % (ref 0–13.4)
NEUTROPHILS # BLD AUTO: 4.46 K/UL (ref 2–7.15)
NEUTROPHILS NFR BLD: 70.5 % (ref 44–72)
NRBC # BLD AUTO: 0 K/UL
NRBC BLD-RTO: 0 /100 WBC
PLATELET # BLD AUTO: 174 K/UL (ref 164–446)
PLATELET # BLD AUTO: 200 K/UL (ref 164–446)
PMV BLD AUTO: 10.7 FL (ref 9–12.9)
PMV BLD AUTO: 10.8 FL (ref 9–12.9)
RBC # BLD AUTO: 3.43 M/UL (ref 4.2–5.4)
RBC # BLD AUTO: 3.63 M/UL (ref 4.2–5.4)
SARS-COV+SARS-COV-2 AG RESP QL IA.RAPID: NOTDETECTED
SPECIMEN SOURCE: NORMAL
WBC # BLD AUTO: 11.7 K/UL (ref 4.8–10.8)
WBC # BLD AUTO: 6.3 K/UL (ref 4.8–10.8)

## 2022-02-11 PROCEDURE — 36415 COLL VENOUS BLD VENIPUNCTURE: CPT

## 2022-02-11 PROCEDURE — 700102 HCHG RX REV CODE 250 W/ 637 OVERRIDE(OP): Performed by: NURSE PRACTITIONER

## 2022-02-11 PROCEDURE — 700111 HCHG RX REV CODE 636 W/ 250 OVERRIDE (IP)

## 2022-02-11 PROCEDURE — 700111 HCHG RX REV CODE 636 W/ 250 OVERRIDE (IP): Performed by: OBSTETRICS & GYNECOLOGY

## 2022-02-11 PROCEDURE — 0KQM0ZZ REPAIR PERINEUM MUSCLE, OPEN APPROACH: ICD-10-PCS | Performed by: OBSTETRICS & GYNECOLOGY

## 2022-02-11 PROCEDURE — 59409 OBSTETRICAL CARE: CPT

## 2022-02-11 PROCEDURE — 59400 OBSTETRICAL CARE: CPT | Performed by: OBSTETRICS & GYNECOLOGY

## 2022-02-11 PROCEDURE — 700105 HCHG RX REV CODE 258: Performed by: NURSE PRACTITIONER

## 2022-02-11 PROCEDURE — 700105 HCHG RX REV CODE 258: Performed by: ANESTHESIOLOGY

## 2022-02-11 PROCEDURE — 700102 HCHG RX REV CODE 250 W/ 637 OVERRIDE(OP)

## 2022-02-11 PROCEDURE — A9270 NON-COVERED ITEM OR SERVICE: HCPCS | Performed by: NURSE PRACTITIONER

## 2022-02-11 PROCEDURE — 770002 HCHG ROOM/CARE - OB PRIVATE (112)

## 2022-02-11 PROCEDURE — 85027 COMPLETE CBC AUTOMATED: CPT

## 2022-02-11 PROCEDURE — 85025 COMPLETE CBC W/AUTO DIFF WBC: CPT

## 2022-02-11 PROCEDURE — 700111 HCHG RX REV CODE 636 W/ 250 OVERRIDE (IP): Performed by: ANESTHESIOLOGY

## 2022-02-11 PROCEDURE — A9270 NON-COVERED ITEM OR SERVICE: HCPCS

## 2022-02-11 PROCEDURE — 304965 HCHG RECOVERY SERVICES

## 2022-02-11 PROCEDURE — 87426 SARSCOV CORONAVIRUS AG IA: CPT

## 2022-02-11 PROCEDURE — 700111 HCHG RX REV CODE 636 W/ 250 OVERRIDE (IP): Performed by: NURSE PRACTITIONER

## 2022-02-11 PROCEDURE — 10907ZC DRAINAGE OF AMNIOTIC FLUID, THERAPEUTIC FROM PRODUCTS OF CONCEPTION, VIA NATURAL OR ARTIFICIAL OPENING: ICD-10-PCS | Performed by: OBSTETRICS & GYNECOLOGY

## 2022-02-11 PROCEDURE — 700101 HCHG RX REV CODE 250: Performed by: ANESTHESIOLOGY

## 2022-02-11 PROCEDURE — 303615 HCHG EPIDURAL/SPINAL ANESTHESIA FOR LABOR

## 2022-02-11 RX ORDER — ROPIVACAINE HYDROCHLORIDE 2 MG/ML
INJECTION, SOLUTION EPIDURAL; INFILTRATION; PERINEURAL
Status: COMPLETED
Start: 2022-02-11 | End: 2022-02-11

## 2022-02-11 RX ORDER — SODIUM CHLORIDE, SODIUM LACTATE, POTASSIUM CHLORIDE, CALCIUM CHLORIDE 600; 310; 30; 20 MG/100ML; MG/100ML; MG/100ML; MG/100ML
INJECTION, SOLUTION INTRAVENOUS PRN
Status: DISCONTINUED | OUTPATIENT
Start: 2022-02-11 | End: 2022-02-12 | Stop reason: HOSPADM

## 2022-02-11 RX ORDER — ROPIVACAINE HYDROCHLORIDE 2 MG/ML
INJECTION, SOLUTION EPIDURAL; INFILTRATION; PERINEURAL CONTINUOUS
Status: DISCONTINUED | OUTPATIENT
Start: 2022-02-11 | End: 2022-02-11

## 2022-02-11 RX ORDER — IBUPROFEN 800 MG/1
800 TABLET ORAL EVERY 8 HOURS
Status: DISCONTINUED | OUTPATIENT
Start: 2022-02-11 | End: 2022-02-12 | Stop reason: HOSPADM

## 2022-02-11 RX ORDER — SODIUM CHLORIDE, SODIUM LACTATE, POTASSIUM CHLORIDE, AND CALCIUM CHLORIDE .6; .31; .03; .02 G/100ML; G/100ML; G/100ML; G/100ML
250 INJECTION, SOLUTION INTRAVENOUS PRN
Status: DISCONTINUED | OUTPATIENT
Start: 2022-02-11 | End: 2022-02-11 | Stop reason: HOSPADM

## 2022-02-11 RX ORDER — SODIUM CHLORIDE, SODIUM LACTATE, POTASSIUM CHLORIDE, AND CALCIUM CHLORIDE .6; .31; .03; .02 G/100ML; G/100ML; G/100ML; G/100ML
1000 INJECTION, SOLUTION INTRAVENOUS
Status: COMPLETED | OUTPATIENT
Start: 2022-02-11 | End: 2022-02-11

## 2022-02-11 RX ORDER — VITAMIN A ACETATE, BETA CAROTENE, ASCORBIC ACID, CHOLECALCIFEROL, .ALPHA.-TOCOPHEROL ACETATE, DL-, THIAMINE MONONITRATE, RIBOFLAVIN, NIACINAMIDE, PYRIDOXINE HYDROCHLORIDE, FOLIC ACID, CYANOCOBALAMIN, CALCIUM CARBONATE, FERROUS FUMARATE, ZINC OXIDE, CUPRIC OXIDE 3080; 12; 120; 400; 1; 1.84; 3; 20; 22; 920; 25; 200; 27; 10; 2 [IU]/1; UG/1; MG/1; [IU]/1; MG/1; MG/1; MG/1; MG/1; MG/1; [IU]/1; MG/1; MG/1; MG/1; MG/1; MG/1
1 TABLET, FILM COATED ORAL
Status: DISCONTINUED | OUTPATIENT
Start: 2022-02-11 | End: 2022-02-12 | Stop reason: HOSPADM

## 2022-02-11 RX ORDER — ACETAMINOPHEN 500 MG
1000 TABLET ORAL EVERY 6 HOURS
Status: DISCONTINUED | OUTPATIENT
Start: 2022-02-11 | End: 2022-02-12 | Stop reason: HOSPADM

## 2022-02-11 RX ORDER — DOCUSATE SODIUM 100 MG/1
100 CAPSULE, LIQUID FILLED ORAL 2 TIMES DAILY PRN
Status: DISCONTINUED | OUTPATIENT
Start: 2022-02-11 | End: 2022-02-12 | Stop reason: HOSPADM

## 2022-02-11 RX ORDER — LIDOCAINE HYDROCHLORIDE AND EPINEPHRINE 15; 5 MG/ML; UG/ML
INJECTION, SOLUTION EPIDURAL
Status: COMPLETED | OUTPATIENT
Start: 2022-02-11 | End: 2022-02-11

## 2022-02-11 RX ADMIN — BUPIVACAINE HYDROCHLORIDE 8 ML: 2.5 INJECTION, SOLUTION EPIDURAL; INFILTRATION; INTRACAUDAL; PERINEURAL at 01:44

## 2022-02-11 RX ADMIN — LIDOCAINE HYDROCHLORIDE,EPINEPHRINE BITARTRATE 3 ML: 15; .005 INJECTION, SOLUTION EPIDURAL; INFILTRATION; INTRACAUDAL; PERINEURAL at 01:44

## 2022-02-11 RX ADMIN — ACETAMINOPHEN 1000 MG: 500 TABLET ORAL at 18:34

## 2022-02-11 RX ADMIN — SODIUM CHLORIDE, POTASSIUM CHLORIDE, SODIUM LACTATE AND CALCIUM CHLORIDE 1000 ML: 600; 310; 30; 20 INJECTION, SOLUTION INTRAVENOUS at 02:10

## 2022-02-11 RX ADMIN — IBUPROFEN 800 MG: 800 TABLET, FILM COATED ORAL at 12:34

## 2022-02-11 RX ADMIN — OXYTOCIN 2 MILLI-UNITS/MIN: 10 INJECTION, SOLUTION INTRAMUSCULAR; INTRAVENOUS at 03:37

## 2022-02-11 RX ADMIN — ROPIVACAINE HYDROCHLORIDE: 2 INJECTION, SOLUTION EPIDURAL; INFILTRATION at 01:48

## 2022-02-11 RX ADMIN — SODIUM CHLORIDE, POTASSIUM CHLORIDE, SODIUM LACTATE AND CALCIUM CHLORIDE 1000 ML: 600; 310; 30; 20 INJECTION, SOLUTION INTRAVENOUS at 01:45

## 2022-02-11 RX ADMIN — IBUPROFEN 800 MG: 800 TABLET, FILM COATED ORAL at 20:49

## 2022-02-11 RX ADMIN — OXYTOCIN 125 ML/HR: 10 INJECTION, SOLUTION INTRAMUSCULAR; INTRAVENOUS at 12:36

## 2022-02-11 RX ADMIN — ROPIVACAINE HYDROCHLORIDE: 2 INJECTION, SOLUTION EPIDURAL; INFILTRATION; PERINEURAL at 01:48

## 2022-02-11 ASSESSMENT — LIFESTYLE VARIABLES
EVER HAD A DRINK FIRST THING IN THE MORNING TO STEADY YOUR NERVES TO GET RID OF A HANGOVER: NO
AVERAGE NUMBER OF DAYS PER WEEK YOU HAVE A DRINK CONTAINING ALCOHOL: 0
TOTAL SCORE: 0
ON A TYPICAL DAY WHEN YOU DRINK ALCOHOL HOW MANY DRINKS DO YOU HAVE: 0
HAVE YOU EVER FELT YOU SHOULD CUT DOWN ON YOUR DRINKING: NO
HOW MANY TIMES IN THE PAST YEAR HAVE YOU HAD 5 OR MORE DRINKS IN A DAY: 0
EVER FELT BAD OR GUILTY ABOUT YOUR DRINKING: NO
TOTAL SCORE: 0
TOTAL SCORE: 0
ALCOHOL_USE: NO
CONSUMPTION TOTAL: NEGATIVE
DOES PATIENT WANT TO STOP DRINKING: NO
HAVE PEOPLE ANNOYED YOU BY CRITICIZING YOUR DRINKING: NO
EVER_SMOKED: NEVER

## 2022-02-11 ASSESSMENT — PAIN SCALES - GENERAL: PAIN_LEVEL: 0

## 2022-02-11 ASSESSMENT — PAIN DESCRIPTION - PAIN TYPE
TYPE: ACUTE PAIN

## 2022-02-11 NOTE — ANESTHESIA PROCEDURE NOTES
Epidural Block    Date/Time: 2/11/2022 1:44 AM  Performed by: Edith Dobbs M.D.  Authorized by: Edith Dobbs M.D.     Patient Location:  OB  Start Time:  2/11/2022 1:44 AM  Reason for Block: labor analgesia    patient identified, IV checked, site marked, risks and benefits discussed, surgical consent, monitors and equipment checked, pre-op evaluation and timeout performed    Patient Position:  Sitting  Prep: ChloraPrep, patient draped and sterile technique    Monitoring:  Blood pressure, continuous pulse oximetry and heart rate  Approach:  Midline  Location:  L4-L5  Injection Technique:  DHAVAL saline  Skin infiltration:  Lidocaine  Strength:  1%  Dose:  3ml  Needle Type:  Tuohy  Needle Gauge:  17 G  Needle Length:  3.5 in  Loss of resistance::  4  Catheter Size:  19 G  Catheter at Skin Depth:  9  Test Dose Result:  Negative

## 2022-02-11 NOTE — PROGRESS NOTES
2342 R eport received from Nikkie HINOJOSA RN. Ambulated from triage to 216. External monitors in place. Admission profile completed. Covid swab obtained and sent. IV started, labs drawn and sent.     0600 Report given to SALLY Gibson. POC discussed, questions answered.

## 2022-02-11 NOTE — PROGRESS NOTES
0600-Report received from Karlene RN-pt care assumed.  0700-SVE=5-6/80/-2, BBOW.  0746-AROM per Timbo ENRIQUEZ, clear, SVE=6/80/-2.  0828-Pt c/o pressure, SVE=complete/+2, Dr. Sky and Dr. Palacios updated.  0837-Pt in [facility], pushing, RN and Dr. Palacios at .  0857-Delivery of viable female, 8/8 apgars.  0915-Infant transported to NBN due to O2 saturation.  1340-Pt up to BR, voided, pericare, gown changed, ready to transfer to PP room.  1400-Pt brought to NBN to see infant.  1440-Report to Kassandra in NBN-pt care assumed.

## 2022-02-11 NOTE — H&P
History and Physical      Tracy Koenig is a 26 y.o. year old female  at 38w5d who presents for RUCs since 3pm this afternoon.    Subjective:   positive  For CTXS.   positive Feels pain   negative for LOF  positive for vaginal bleeding.   positive for fetal movement    ROS: A comprehensive review of systems was negative.    Past Medical History:   Diagnosis Date   • Environmental allergies    • ORAL CONTRACEPTION EVAL PRIO TO METHOE W/WO INITIATION    • Oral contraceptive use      No past surgical history on file.  OB History    Para Term  AB Living   2 1 1 0 0 1   SAB IAB Ectopic Molar Multiple Live Births   0 0 0   0 1      # Outcome Date GA Lbr Antonio/2nd Weight Sex Delivery Anes PTL Lv   2 Current            1 Term 18 40w5d  3.26 kg (7 lb 3 oz)  Vag-Spont   SHAWN     Social History     Socioeconomic History   • Marital status:      Spouse name: Not on file   • Number of children: Not on file   • Years of education: Not on file   • Highest education level: Not on file   Occupational History   • Not on file   Tobacco Use   • Smoking status: Never Smoker   • Smokeless tobacco: Never Used   • Tobacco comment: avoid all tobacco products   Vaping Use   • Vaping Use: Never used   Substance and Sexual Activity   • Alcohol use: Yes     Alcohol/week: 0.0 oz     Comment: twice a month   • Drug use: No   • Sexual activity: Yes     Partners: Male   Other Topics Concern   • Not on file   Social History Narrative   • Not on file     Social Determinants of Health     Financial Resource Strain:    • Difficulty of Paying Living Expenses: Not on file   Food Insecurity:    • Worried About Running Out of Food in the Last Year: Not on file   • Ran Out of Food in the Last Year: Not on file   Transportation Needs:    • Lack of Transportation (Medical): Not on file   • Lack of Transportation (Non-Medical): Not on file   Physical Activity:    • Days of Exercise per Week: Not on file   • Minutes of  Exercise per Session: Not on file   Stress:    • Feeling of Stress : Not on file   Social Connections:    • Frequency of Communication with Friends and Family: Not on file   • Frequency of Social Gatherings with Friends and Family: Not on file   • Attends [employer] Services: Not on file   • Active Member of Clubs or Organizations: Not on file   • Attends Club or Organization Meetings: Not on file   • Marital Status: Not on file   Intimate Partner Violence:    • Fear of Current or Ex-Partner: Not on file   • Emotionally Abused: Not on file   • Physically Abused: Not on file   • Sexually Abused: Not on file   Housing Stability:    • Unable to Pay for Housing in the Last Year: Not on file   • Number of Places Lived in the Last Year: Not on file   • Unstable Housing in the Last Year: Not on file     Allergies: Patient has no known allergies.  No current facility-administered medications for this encounter.    P[facility]atal care with [facility] starting at 13wks with following problems:  Patient Active Problem List    Diagnosis Date Noted   • Cystic fibrosis carrier 08/18/2021   • Annual physical exam 09/05/2019   • Health care maintenance 09/05/2019   • Left ovarian cyst 09/05/2019         Objective:      Ht 1.524 m (5')   Wt 67.6 kg (149 lb)     General:   no acute distress, alert, cooperative   Skin:   normal   HEENT:  PERRLA   Lungs:   CTA bilateral   Heart:   S1, S2 normal, no murmur, click, rub or gallop, regular rate and rhythm, brisk carotid upstroke without bruits, peripheral pulses very brisk, chest is clear without rales or wheezing, no pedal edema, no JVD, no hepatosplenomegaly   Abdomen:   gravid, NT   EFW:  3400 g   Pelvis:  Exam deferred., proven to 3200 g   FHTs: 140 BPM + accels - decels mod variability   Contractions: 1 contractions in 10 min mod to palpation   Uterine Size: S=D   Presentations: Cephalic per RN   Cervix: Per RN    Dilation: 6    Effacement: 100%    Station:  -2    Consistency: Medium    Position:  Middle     Complete OB US  See scanned media.    Lab Review  Lab:   Blood type: AB     Recent Results (from the past 5880 hour(s))   POCT US - In Clinic OB Scan    Collection Time: 07/22/21  4:29 PM   Result Value Ref Range    In Clinic OB Scan     VARICELLA ZOSTER IGG AB    Collection Time: 09/07/21  2:11 PM   Result Value Ref Range    Varicella Zoster IgG Ab 0.22    HEP C VIRUS ANTIBODY    Collection Time: 09/07/21  2:11 PM   Result Value Ref Range    Hepatitis C Antibody Non-Reactive Non-Reactive   NIPT FOR FETAL ANEUPLOIDY W/22Q11.2 MICRODELETION ([place])    Collection Time: 09/07/21  2:11 PM   Result Value Ref Range    Result Summary See Notes     Trisomy 21 Low Risk     Trisomy 18 Low Risk     Trisomy 13 Low Risk     Monosomy X Low Risk     Triploidy/Vanishing Twin Low Risk     22q11.2 Deletion Syndrome Low Risk     Fetal Fraction 13.6 %    Report Fetal Sex? Yes     Fetal Sex Female     Gestation Age 16 weeks    Gestational Age at Draw (days) 4 d    Maternal Weight (pounds) 125 lbs    Maternal Weight (inches) 60     Number of Fetuses One     EER Fetal Aneuploidy w/22q11.2 See Note    Chlamydia/GC PCR Urine Or Swab    Collection Time: 09/07/21  2:12 PM    Specimen: Urine, First Catch   Result Value Ref Range    C. trachomatis by PCR Negative Negative    N. gonorrhoeae by PCR Negative Negative    Source Urine    URINE DRUG SCREEN    Collection Time: 09/07/21  2:12 PM   Result Value Ref Range    Amphetamines Urine Negative Negative    Barbiturates Negative Negative    Benzodiazepines Negative Negative    Cocaine Metabolite Negative Negative    Methadone Negative Negative    Opiates Negative Negative    Oxycodone Negative Negative    Phencyclidine -Pcp Negative Negative    Propoxyphene Negative Negative    Cannabinoid Metab Negative Negative   PRENATAL PANEL 3+HIV+UACXI    Collection Time: 09/07/21  2:12 PM   Result Value Ref Range    Color Yellow     Character Clear     Specific Gravity 1.020 <1.035    Ph 5.5  5.0 - 8.0    Glucose Negative Negative mg/dL    Ketones Negative Negative mg/dL    Protein Negative Negative mg/dL    Bilirubin Negative Negative    Nitrite Negative Negative    Leukocyte Esterase Negative Negative    Occult Blood Negative Negative    Micro Urine Req see below     WBC 10.1 4.8 - 10.8 K/uL    RBC 3.80 (L) 4.20 - 5.40 M/uL    Hemoglobin 12.2 12.0 - 16.0 g/dL    Hematocrit 36.3 (L) 37.0 - 47.0 %    MCV 95.5 81.4 - 97.8 fL    MCH 32.1 27.0 - 33.0 pg    MCHC 33.6 33.6 - 35.0 g/dL    RDW 43.1 35.9 - 50.0 fL    Platelet Count 258 164 - 446 K/uL    MPV 9.0 9.0 - 12.9 fL    Neutrophils-Polys 79.10 (H) 44.00 - 72.00 %    Lymphocytes 15.50 (L) 22.00 - 41.00 %    Monocytes 3.70 0.00 - 13.40 %    Eosinophils 1.00 0.00 - 6.90 %    Basophils 0.20 0.00 - 1.80 %    Immature Granulocytes 0.50 0.00 - 0.90 %    Nucleated RBC 0.00 /100 WBC    Neutrophils (Absolute) 8.01 (H) 2.00 - 7.15 K/uL    Lymphs (Absolute) 1.57 1.00 - 4.80 K/uL    Monos (Absolute) 0.37 0.00 - 0.85 K/uL    Eos (Absolute) 0.10 0.00 - 0.51 K/uL    Baso (Absolute) 0.02 0.00 - 0.12 K/uL    Immature Granulocytes (abs) 0.05 0.00 - 0.11 K/uL    NRBC (Absolute) 0.00 K/uL    Rubella IgG Antibody 156.00 IU/mL    Hepatitis B Surface Antigen Non-Reactive Non-Reactive    Syphilis, Treponemal Qual Non-Reactive Non-Reactive   URINE CULTURE(NEW)    Collection Time: 09/07/21  2:12 PM    Specimen: Urine   Result Value Ref Range    Significant Indicator NEG     Source UR     Site -     Culture Result Usual skin saul 10-50,000 cfu/mL    HIV AG/AB COMBO ASSAY SCREENING    Collection Time: 09/07/21  2:12 PM   Result Value Ref Range    HIV Ag/Ab Combo Assay Non-Reactive Non Reactive   OP Prenatal Panel-Blood Bank    Collection Time: 09/07/21  2:12 PM   Result Value Ref Range    ABO Grouping Only AB     Rh Grouping Only POS     Antibody Screen Scrn NEG    SARS-CoV-2, PCR (In-House): Collect NP OR nasal swab in VTM    Collection Time: 10/25/21  3:00 PM    Specimen:  Respirate   Result Value Ref Range    SARS-CoV-2 Source Nasal Swab     SARS-CoV-2 by PCR DETECTED (AA)    COVID/SARS CoV-2 PCR    Collection Time: 10/25/21  3:00 PM   Result Value Ref Range    COVID Order Status Received    T.PALLIDUM AB EIA    Collection Time: 12/14/21  8:36 AM   Result Value Ref Range    Syphilis, Treponemal Qual Non-Reactive Non-Reactive   GLUCOSE 1HR GESTATIONAL    Collection Time: 12/14/21  8:36 AM   Result Value Ref Range    Glucose, Post Dose 46 (L) 70 - 139 mg/dL   CBC WITH DIFFERENTIAL    Collection Time: 12/14/21  8:36 AM   Result Value Ref Range    WBC 8.5 4.8 - 10.8 K/uL    RBC 3.57 (L) 4.20 - 5.40 M/uL    Hemoglobin 11.0 (L) 12.0 - 16.0 g/dL    Hematocrit 34.6 (L) 37.0 - 47.0 %    MCV 96.9 81.4 - 97.8 fL    MCH 30.8 27.0 - 33.0 pg    MCHC 31.8 (L) 33.6 - 35.0 g/dL    RDW 45.9 35.9 - 50.0 fL    Platelet Count 246 164 - 446 K/uL    MPV 9.3 9.0 - 12.9 fL    Neutrophils-Polys 74.10 (H) 44.00 - 72.00 %    Lymphocytes 18.40 (L) 22.00 - 41.00 %    Monocytes 5.40 0.00 - 13.40 %    Eosinophils 0.90 0.00 - 6.90 %    Basophils 0.60 0.00 - 1.80 %    Immature Granulocytes 0.60 0.00 - 0.90 %    Nucleated RBC 0.00 /100 WBC    Neutrophils (Absolute) 6.30 2.00 - 7.15 K/uL    Lymphs (Absolute) 1.56 1.00 - 4.80 K/uL    Monos (Absolute) 0.46 0.00 - 0.85 K/uL    Eos (Absolute) 0.08 0.00 - 0.51 K/uL    Baso (Absolute) 0.05 0.00 - 0.12 K/uL    Immature Granulocytes (abs) 0.05 0.00 - 0.11 K/uL    NRBC (Absolute) 0.00 K/uL   POCT Fetal Nonstress Test    Collection Time: 01/04/22 11:35 AM   Result Value Ref Range    NST Indications      NST Baseline      NST Uterine Activity      NST Acoustic Stimulation      NST Assessment      NST Action Necessary      NST Other Data      NST Return      NST Read By     GRP B STREP, BY PCR (LEACH BROTH)    Collection Time: 01/24/22 11:17 AM    Specimen: Genital   Result Value Ref Range    Strep Gp B DNA PCR Negative Negative        Assessment:   1.  IUP at 38w5d  2.  Labor  status: Active phase labor.  3.  Cat 1 FHTs  4.  Obstetrical history significant for   Patient Active Problem List    Diagnosis Date Noted   • Cystic fibrosis carrier 08/18/2021   • Annual physical exam 09/05/2019   • Health care maintenance 09/05/2019   • Left ovarian cyst 09/05/2019   .      Plan:     Admit to L&D  GBS negative  Routine labs  Pain management prn - pt desires epidural.

## 2022-02-11 NOTE — PROGRESS NOTES
S: Pt is comfortable c epidural.    O:    Vitals:    02/11/22 0540 02/11/22 0600 02/11/22 0654 02/11/22 0714   BP: 116/66 124/68 137/86 115/66   Pulse: 72 78 73 73   Temp:  37.4 °C (99.3 °F)     TempSrc:  Temporal     SpO2:       Weight:       Height:               FHTs:  Baseline 125, + accels, - decels, mod variability        [place]: 3 contractions in 10 minutes, mod to palpation, pitocin @ 8 milliunits        SVE: 6/80/-2        AROM for clear AF.     A/P:    1.  IUP @ 38w6d   2.  Cat 1 FHTs    3.  Continue present management.    4.  Active labor.  5.  Continue pitocin augmentation.    Ronit Izquierdo, CNM, APN  Dr. Rodriguez -- attending physician

## 2022-02-11 NOTE — L&D DELIVERY NOTE
OB Vaginal Delivery Note    2022  Tracy Koenig  26 y.o.    Patient was admitted to Labor and Delivery at 38w6d for active labor    Review the Delivery Report for details.     Gestational Age: 38w6d  /Para:   Labor Complications: None   Blood Loss:   Delivery Blood Loss  02/10/22 2200 - 22 0934    Est. Blood Loss Hospital Encounter 200 mL    Total  200 mL        Delivery Type: Vaginal, Spontaneous   ROM to Delivery Time: 1h 11m  [place] Sex: Female   Weight: 3.25 kg (7 lb 2.6 oz)    1 Minute 5 Minute 10 Minute   Apgar Totals: 8   8          Delivery Details:  Tracy Koenig, a 26 y.o.  female delivered a viable Female infant with Apgars of 8  and 8 . Patient was fully dilated and began pushing. The patient was put in the dorsal lithotomy position. Delivery was via Vaginal, Spontaneous  to a sterile field under Epidural  anesthesia. Infant delivered in Vertex    Occiput  Anterior  position. Anterior and posterior shoulders delivered without difficulty.    The cord was clamped, cut twice and 3 Vessels  were noted. Nuchal cord was 1 loop was noted. Baby was passed through. Cord blood was obtained in routine fashion with the following disposition:  . Intact  placenta delivered at 2022  8:00 AM . Placental disposition: discarded . Fundal massage performed and fundus found to be firm. Perineum, vagina, cervix were inspected, and the following lacerations were noted:    [facility] Baby Girl [[id number]]    Lacerations    Episiotomy: None    Perineal lacerations: 2nd Repaired?: Yes   Perineal suture type: 3-0 Vicryl   Vaginal laceration?: No    Cervical laceration?: No    Number of repair packets: 1          Excellent hemostasis was noted. Needle count correct.    Infant and patient in delivery room in good and stable condition.       The entire delivery was attended by and laceration repaired by Dr. Sky.      Domingo Palacios M.D.

## 2022-02-11 NOTE — PROGRESS NOTES
EDC -  EGA - 38-5    2145 - Pt arrived to labor and delivery for contractions. Pt placed in room LDA2.  2156 - External monitors in place X2. Category I FHT at this time. VSS. Pt reports good FM. No complaints of ROM or vaginal bleeding. SVE 4-/-3. FOB at bedside. POC discussed with pt and family members, all questions answered. Will recheck in 1hr.   2305 - SVE /-3. Updated [facility]. Orders received.   2340 - Report given. POC discussed. Pt tx to S216.

## 2022-02-11 NOTE — CARE PLAN
The patient is Stable - Low risk of patient condition declining or worsening    Shift Goals  Clinical Goals: remain clinically stable    Progress made toward(s) clinical / shift goals:  Patient has scant to light lochia with a firm palpable uterus.  Vital signs are within defined limits.  Patient will ask for pain medication when needed.  Assessment will continue.     Patient is not progressing towards the following goals: na

## 2022-02-11 NOTE — ANESTHESIA PREPROCEDURE EVALUATION
Date: 02/11/22  Procedure: Labor Epidural         Relevant Problems   No relevant active problems       Physical Exam    Airway   Mallampati: I  TM distance: >3 FB  Neck ROM: full       Cardiovascular - normal exam     Dental - normal exam           Pulmonary    Abdominal    Neurological - normal exam                 Anesthesia Plan    ASA 2       Plan - epidural   Neuraxial block will be labor analgesia                  Pertinent diagnostic labs and testing reviewed    Informed Consent:    Anesthetic plan and risks discussed with patient.

## 2022-02-12 ENCOUNTER — PHARMACY VISIT (OUTPATIENT)
Dept: PHARMACY | Facility: MEDICAL CENTER | Age: 27
End: 2022-02-12
Payer: COMMERCIAL

## 2022-02-12 VITALS
TEMPERATURE: 98.4 F | HEART RATE: 77 BPM | HEIGHT: 60 IN | SYSTOLIC BLOOD PRESSURE: 117 MMHG | DIASTOLIC BLOOD PRESSURE: 69 MMHG | BODY MASS INDEX: 29.25 KG/M2 | RESPIRATION RATE: 20 BRPM | OXYGEN SATURATION: 97 % | WEIGHT: 149 LBS

## 2022-02-12 PROCEDURE — 700102 HCHG RX REV CODE 250 W/ 637 OVERRIDE(OP)

## 2022-02-12 PROCEDURE — A9270 NON-COVERED ITEM OR SERVICE: HCPCS

## 2022-02-12 PROCEDURE — RXMED WILLOW AMBULATORY MEDICATION CHARGE

## 2022-02-12 RX ORDER — IBUPROFEN 800 MG/1
800 TABLET ORAL EVERY 8 HOURS
Qty: 30 TABLET | Refills: 0 | Status: SHIPPED | OUTPATIENT
Start: 2022-02-12 | End: 2024-03-08

## 2022-02-12 RX ORDER — PSEUDOEPHEDRINE HCL 30 MG
100 TABLET ORAL 2 TIMES DAILY PRN
Qty: 60 CAPSULE | Refills: 0 | Status: SHIPPED | OUTPATIENT
Start: 2022-02-12 | End: 2024-03-08

## 2022-02-12 RX ORDER — ACETAMINOPHEN 500 MG
1000 TABLET ORAL EVERY 6 HOURS
Qty: 30 TABLET | Refills: 0 | Status: SHIPPED | OUTPATIENT
Start: 2022-02-12 | End: 2024-03-08

## 2022-02-12 RX ADMIN — ACETAMINOPHEN 1000 MG: 500 TABLET ORAL at 09:44

## 2022-02-12 RX ADMIN — ACETAMINOPHEN 1000 MG: 500 TABLET ORAL at 03:36

## 2022-02-12 RX ADMIN — IBUPROFEN 800 MG: 800 TABLET, FILM COATED ORAL at 06:31

## 2022-02-12 RX ADMIN — IBUPROFEN 800 MG: 800 TABLET, FILM COATED ORAL at 13:05

## 2022-02-12 RX ADMIN — PRENATAL WITH FERROUS FUM AND FOLIC ACID 1 TABLET: 3080; 920; 120; 400; 22; 1.84; 3; 20; 10; 1; 12; 200; 27; 25; 2 TABLET ORAL at 09:44

## 2022-02-12 ASSESSMENT — EDINBURGH POSTNATAL DEPRESSION SCALE (EPDS)
I HAVE LOOKED FORWARD WITH ENJOYMENT TO THINGS: AS MUCH AS I EVER DID
I HAVE FELT SAD OR MISERABLE: NO, NOT AT ALL
I HAVE BEEN SO UNHAPPY THAT I HAVE HAD DIFFICULTY SLEEPING: NOT AT ALL
I HAVE BEEN SO UNHAPPY THAT I HAVE BEEN CRYING: NO, NEVER
I HAVE BEEN ABLE TO LAUGH AND SEE THE FUNNY SIDE OF THINGS: AS MUCH AS I ALWAYS COULD
I HAVE FELT SCARED OR PANICKY FOR NO GOOD REASON: NO, NOT AT ALL
THINGS HAVE BEEN GETTING ON TOP OF ME: NO, MOST OF THE TIME I HAVE COPED QUITE WELL
THE THOUGHT OF HARMING MYSELF HAS OCCURRED TO ME: NEVER
I HAVE BEEN ANXIOUS OR WORRIED FOR NO GOOD REASON: NO, NOT AT ALL
I HAVE BLAMED MYSELF UNNECESSARILY WHEN THINGS WENT WRONG: NOT VERY OFTEN

## 2022-02-12 ASSESSMENT — PAIN DESCRIPTION - PAIN TYPE
TYPE: ACUTE PAIN

## 2022-02-12 NOTE — DISCHARGE SUMMARY
Discharge Summary:     Date of Admission: 2/10/2022  Date of Discharge: 22      Admitting diagnosis:    1. Pregnancy @ 38w6d      Discharge Diagnosis:   1. Status post vaginal, spontaneous.      Past Medical History:   Diagnosis Date   • Environmental allergies    • ORAL CONTRACEPTION EVAL PRIO TO METHOE W/WO INITIATION    • Oral contraceptive use      OB History    Para Term  AB Living   2 2 2 0 0 2   SAB IAB Ectopic Molar Multiple Live Births   0 0 0   0 2      # Outcome Date GA Lbr Antonio/2nd Weight Sex Delivery Anes PTL Lv   2 Term 22 38w6d 10:28 / 00:29 3.25 kg (7 lb 2.6 oz) F Vag-Spont EPI N SHAWN   1 Term 18 40w5d  3.26 kg (7 lb 3 oz)  Vag-Spont   SHAWN     History reviewed. No pertinent surgical history.  Patient has no known allergies.    Patient Active Problem List   Diagnosis   • Annual physical exam   • Health care maintenance   • Left ovarian cyst   • Cystic fibrosis carrier   • Indication for care in labor or delivery       Hospital Course:   Pt is a 26 y.o. now  who presented in active labor. Her labor course was uncomplicated. She delivered on  at 8:57 AM. Delivery was complicated by a 2nd degree laceration and a nuchal cord which they baby was delivered through. Post-partum has been unremarkable for Mom. Minimal lochia. Pain well controlled.      Baby course: APGARS were 8/8 but Baby required blow by and CPT after 5 minutes of skin to skin to maintain saturations. Baby given fluid bolus and placed under the carranza to maintain sats - back on RA and at parent's bedside at 0200.      Physical Exam:  Temp:  [36.8 °C (98.2 °F)-37.2 °C (98.9 °F)] 37.2 °C (98.9 °F)  Pulse:  [] 79  Resp:  [18] 18  BP: (119-135)/(61-92) 120/77  SpO2:  [96 %-98 %] 96 %  Physical Exam  General: well and resting  Chest/Breasts: nipples intact and breasts soft   Abdomen: nontender, soft, non-distended  Fundus: firm, below umbilicus and nontender  Incision: not applicable, (vaginal  delivery)  Perineum: deferred  Extremities: symmetric and no edema, calves nontender    Current Facility-Administered Medications   Medication Dose   • lactated ringers infusion     • docusate sodium (COLACE) capsule 100 mg  100 mg   • ibuprofen (MOTRIN) tablet 800 mg  800 mg   • acetaminophen (TYLENOL) tablet 1,000 mg  1,000 mg   • prenatal plus vitamin (STUARTNATAL 1+1) 27-1 MG tablet 1 Tablet  1 Tablet   • oxytocin (PITOCIN) infusion (for post delivery)  125 mL/hr   • D5LR infusion         Recent Labs     02/11/22  0015 02/11/22  1720   WBC 6.3 11.7*   RBC 3.63* 3.43*   HEMOGLOBIN 10.0* 9.5*   HEMATOCRIT 32.0* 29.9*   MCV 88.2 87.2   MCH 27.5 27.7   MCHC 31.3* 31.8*   RDW 47.2 46.5   PLATELETCT 200 174   MPV 10.8 10.7         Activity/ Discharge Instructions::   Discharge to home  Pelvic Rest x 6 weeks  No heavy lifting x4 weeks  Call or come to ED for: heavy vaginal bleeding, fever >100.4, severe abdominal pain, severe headache, chest pain, shortness of breath,  N/V, incisional drainage, or other concerns.      Pain control:  Tylenol 500 - 1000 mg every 6 hours as needed  Ibuprofen 800 mg every 8 hours as needed       Follow up:  [facility] in 5 weeks for vaginal delivery.    She wishes to discuss BC options at that time. Is hesitant to start systemic hormonal treaments but is interested in learning more about IUD's and non-hormonal options    Discharge Meds:   No current outpatient medications on file.       Domingo Palacios M.D.

## 2022-02-12 NOTE — CARE PLAN
The patient is Stable - Low risk of patient condition declining or worsening    Shift Goals  Clinical Goals: Pain control, maintain firm fundus and light lochia  Patient Goals: Breastfeed  Family Goals: Bond and rest    Progress made toward(s) clinical / shift goals:    Problem: Altered Physiologic Condition  Goal: Patient physiologically stable as evidenced by normal lochia, palpable uterine involution and vitals within normal limits  Outcome: Progressing  Note: Fundus firm, lochia light.      Problem: Pain - Standard  Goal: Alleviation of pain or a reduction in pain to the patient’s comfort goal  Outcome: Progressing  Note: Pain controlled with motrin and tylenol.      Problem: Psychosocial - Postpartum  Goal: Patient will verbalize and demonstrate effective bonding and parenting behavior  Outcome: Progressing  Note: Patient went to NBN to breastfeed. Infant came out to room at 0200.  Patient breastfeeding independently.         Patient is not progressing towards the following goals: n/a

## 2022-02-12 NOTE — ANESTHESIA TIME REPORT
Anesthesia Start and Stop Event Times     Date Time Event    2/11/2022 0135 Ready for Procedure     0135 Anesthesia Start     0857 Anesthesia Stop        Responsible Staff  02/11/22    Name Role Begin End    Edith Dobbs M.D. Anesth 0135 0658    Franko Prater M.D. Anesth 0658 0857        Preop Diagnosis (Free Text):  Pre-op Diagnosis             Preop Diagnosis (Codes):    Premium Reason  A. 3PM - 7AM    Comments:

## 2022-02-12 NOTE — LACTATION NOTE
This note was copied from a baby's chart.  26yr, , 38wk6d, baby in NBN for the first 12 hours of life for respiratory observation and assistance    MOB desirs to breastfeed exclusively.  History obtained and last baby was in NICU and even though off to a rough breastfeeding start, was able to be successful exclusively breastfeeding for 3 years.  Plan is to breastfeed this baby.  MOB states that she did start using breast pump when baby was in NBN nut now is in room and has been breastfeeding well.  Denies breast or nipple pain with breastfeeding.  Plan to discharge home today and MOB denies any questions or concerns.    [facility] breast pump registration kit provided.    Basic  breastfeeding information provided as follows:    Feed baby with feeding cues and at least a minimum of 8x/24 hours.  Expect cluster feeding as this is normal during early days of life and growth spurts.  It is not recommended to let baby sleep longer than 4 hours between feedings and if sleepy, place skin to skin to promote feeding interest and milk production.  Baby's usually feed more frequently and longer when skin to skin with mother. It is not recommended to use pacifiers or supplementing with formula until breast feeding and milk production is well established or at least 4 weeks.    Make sure infant is getting enough by ensuring frequent feedings as well as looking for transitioning stools from dark meconium to bright yellow/green seedy loose stools by day 5.     Follow up with PEDS PCP as scheduled for weight checks and to make sure feeding is progressing appropriately.    Call for breastfeeding for 1:1 lactation consultation through  [facility] (see information sheet) as needed and attend the  [employer] without need for appointment.    [facility] referral sent via [employer]

## 2022-02-12 NOTE — DISCHARGE PLANNING
Meds-to-Beds: Discharge prescription orders listed below delivered to patient's bedside. SALLY Cottrell notified. Patient declines .    Current Outpatient Medications   Medication Sig Dispense Refill   • acetaminophen (TYLENOL) 500 MG Tab Take 2 Tablets by mouth every 6 hours. 30 Tablet 0   • docusate sodium 100 MG Cap Take 1 capsule by mouth 2 times a day as needed for Constipation. 60 Capsule 0   • ibuprofen (MOTRIN) 800 MG Tab Take 1 Tablet by mouth every 8 hours. 30 Tablet 0      Jesika Mahmood, PharmD

## 2022-02-12 NOTE — CARE PLAN
The patient is Stable - Low risk of patient condition declining or worsening    Shift Goals  Clinical Goals: Clincally stable  Patient Goals: Breastfeed  Family Goals: Bond and rest    Progress made toward(s) clinical / shift goals:  Patient is clinically stable    Patient is not progressing towards the following goals:

## 2022-02-12 NOTE — ANESTHESIA POSTPROCEDURE EVALUATION
Patient: Tracy Koenig    Procedure Summary     Date: 02/11/22 Room / Location:     Anesthesia Start: 0135 Anesthesia Stop: 0857    Procedure: Labor Epidural Diagnosis:     Scheduled Providers:  Responsible Provider: Franko Prater M.D.    Anesthesia Type: epidural ASA Status: 2          Final Anesthesia Type: epidural  Last vitals  BP   Blood Pressure: 129/84    Temp   37 °C (98.6 °F)    Pulse   75   Resp   18    SpO2   96 %      Anesthesia Post Evaluation    Patient location during evaluation: bedside  Patient participation: complete - patient participated  Level of consciousness: awake and alert  Pain score: 0    Airway patency: patent  Anesthetic complications: no  Cardiovascular status: hemodynamically stable  Respiratory status: acceptable  Hydration status: euvolemic    PONV: none          No complications documented.     Nurse Pain Score: 0 (NPRS)

## 2022-02-12 NOTE — PROGRESS NOTES
Assessment completed.  Fundus firm, lochia light.  Administered motrin for pain.  Patient's infant being monitored in NBN at this time.  She is pumping and her  is taking her via wheelchair to visit the baby.  Will continue to monitor.

## 2022-02-12 NOTE — DISCHARGE INSTRUCTIONS
PATIENT DISCHARGE EDUCATION INSTRUCTION SHEET  REASONS TO CALL YOUR OBSTETRICIAN  · Persistent fever, shaking, chills (Temperature higher than 100.4) may indicate you have an infection  · Heavy bleeding: soaking more than 1 pad per hour; Passing clots an egg-sized clot or bigger may mean you have an postpartum hemorrhage  · Foul odor from vagina or bad smelling or discolored discharge or blood  · Breast infection (Mastitis symptoms); breast pain, chills, fever, redness or red streaks, may feel flu like symptoms  · Urinary pain, burning or frequency  · Incision that is not healing, increased redness, swelling, tenderness or pain, or any pus from episiotomy or  site may mean you have an infection  · Redness, swelling, warmth, or painful to touch in the calf area of your leg may mean you have a blood clot  · Severe or intensified depression, thoughts or feelings of wanting to hurt yourself or someone else   · Pain in chest, obstructed breathing or shortness of breath (trouble catching your breath) may mean you are having a postpartum complication. Call your provider immediately   · Headache that does not get better, even after taking medicine, a bad headache with vision changes or pain in the upper right area of your belly may mean you have high blood pressure or post birth preeclampsia. Call your provider immediately    HAND WASHING  All family and friends should wash their hands:  · Before and after holding the baby  · Before feeding the baby  · After using the restroom or changing the baby's diaper    WOUND CARE  Ask your physician for additional care instructions. In general:  ·  Incision:  · May shower and pat incision dry   · Keep the incision clean and dry  · There should not be any opening or pus from the incision  · Continue to walk at home 3 times a day   · Do NOT lift anything heavier than your baby (over 10 pounds)  · Encourage family to help participate in care of the  to allow  rest and mom time to heal  · Episiotomy/Laceration  · May use cale-spray bottle, witch hazel pads and dermaplast spray for comfort  · Use cale-spray bottle after urinating to cleanse perineal area  · To prevent burning during urination spray cale-water bottle on labial area   · Pat perineal area dry until episiotomy/laceration is healed  · Continue to use cale-bottle until bleeding stops as needed  · If have a 2nd degree laceration or greater, a Sitz bath can offer relief from soreness, burning, and inflammation   · Sitz Bath   · Sit in 6 inches of warm water and soak laceration as needed until the laceration heals    VAGINAL CARE AND BLEEDING  · Nothing inside vagina for 6 weeks:   · No sexual intercourse, tampons or douching  · Bleeding may continue for 2-4 weeks. Amount and color may vary  · Soaking 1 pad or more in an hour for several hours is considered heavy bleeding  · Passing large egg sized blood clots can be concerning  · If you feel like you have heavy bleeding or are having increasing amount of blood clots call your Obstetrician immediately  · If you begin feeling faint upon standing, feeling sick to your stomach, have clammy skin, a really fast heartbeat, have chills, start feeling confused, dizzy, sleepy or weak, or feeling like you're going to faint call your Obstetrician immediately    HYPERTENSION   Preeclampsia or gestational hypertension are types of high blood pressure that only pregnant women can get. It is important for you to be aware of symptoms to seek early intervention and treatment. If you have any of these symptoms immediately call your Obstetrician    · Vision changes or blurred vision   · Severe headache or pain that is unrelieved with medication and will not go away  · Persistent pain in upper abdomen or shoulder   · Increased swelling of face, feet, or hands  · Difficulty breathing or shortness of breath at rest  · Urinating less than usual    URINATION AND BOWEL MOVEMENTS  · Eating  "more fiber (bran cereal, fruits, and vegetables) and drinking plenty of fluids will help to avoid constipation  · Urinary frequency and urgency after childbirth is normal  · If you experience any urinary pain, burning or frequency call your provider    BIRTH CONTROL  · It is possible to become pregnant at any time after delivery and while breastfeeding  · Plan to discuss a method of birth control with your physician at your post delivery follow up visit    POSTPARTUM BLUES  During the first few days after birth, you may experience a sense of the \"blues\" which may include impatience, irritability or even crying. These feelings come and go quickly. However, as many as 1 in 10 women experience emotional symptoms known as postpartum depression.     POSTPARTUM DEPRESSION    May start as early as the second or third day after delivery or take several weeks or months to develop. Symptoms of \"blues\" are present, but are more intense: Crying spells; loss of appetite; feelings of hopelessness or loss of control; fear of touching the baby; over concern or no concern at all about the baby; little or no concern about your own appearance/caring for yourself; and/or inability to sleep or excessive sleeping. Contact your Obstetrician if you are experiencing any of these symptoms     PREVENTING SHAKEN BABY  If you are angry or stressed, PUT THE BABY IN THE CRIB, step away, take some deep breaths, and wait until you are calm to care for the baby. DO NOT SHAKE THE BABY. You are not alone, call a supporter for help.  · [facility] 24/7 crisis call line (591-359-9932) or (1-201.532.3396)  · You can also text them, text \"ANSWER\" (276312)      "

## 2022-02-14 ENCOUNTER — TELEPHONE (OUTPATIENT)
Dept: OBGYN | Facility: CLINIC | Age: 27
End: 2022-02-14

## 2022-03-22 ASSESSMENT — EDINBURGH POSTNATAL DEPRESSION SCALE (EPDS)
I HAVE FELT SAD OR MISERABLE: NO, NOT AT ALL
I HAVE BEEN ABLE TO LAUGH AND SEE THE FUNNY SIDE OF THINGS: AS MUCH AS I ALWAYS COULD
I HAVE BEEN SO UNHAPPY THAT I HAVE HAD DIFFICULTY SLEEPING: NOT AT ALL
TOTAL SCORE: 4
I HAVE BEEN SO UNHAPPY THAT I HAVE BEEN CRYING: ONLY OCCASIONALLY
I HAVE BLAMED MYSELF UNNECESSARILY WHEN THINGS WENT WRONG: YES, SOME OF THE TIME
THINGS HAVE BEEN GETTING ON TOP OF ME: NO, MOST OF THE TIME I HAVE COPED QUITE WELL
I HAVE LOOKED FORWARD WITH ENJOYMENT TO THINGS: AS MUCH AS I EVER DID
I HAVE BEEN ANXIOUS OR WORRIED FOR NO GOOD REASON: NO, NOT AT ALL
THE THOUGHT OF HARMING MYSELF HAS OCCURRED TO ME: NEVER
I HAVE FELT SCARED OR PANICKY FOR NO GOOD REASON: NO, NOT AT ALL

## 2022-03-25 ENCOUNTER — POST PARTUM (OUTPATIENT)
Dept: OBGYN | Facility: CLINIC | Age: 27
End: 2022-03-25
Payer: COMMERCIAL

## 2022-03-25 VITALS — DIASTOLIC BLOOD PRESSURE: 84 MMHG | BODY MASS INDEX: 24.88 KG/M2 | WEIGHT: 127.4 LBS | SYSTOLIC BLOOD PRESSURE: 121 MMHG

## 2022-03-25 DIAGNOSIS — Z30.9 ENCOUNTER FOR CONTRACEPTIVE MANAGEMENT, UNSPECIFIED TYPE: ICD-10-CM

## 2022-03-25 PROCEDURE — 0503F POSTPARTUM CARE VISIT: CPT | Performed by: OBSTETRICS & GYNECOLOGY

## 2022-03-25 RX ORDER — ACETAMINOPHEN AND CODEINE PHOSPHATE 120; 12 MG/5ML; MG/5ML
1 SOLUTION ORAL DAILY
Qty: 28 TABLET | Refills: 11 | Status: SHIPPED | OUTPATIENT
Start: 2022-03-25 | End: 2024-03-08

## 2022-03-25 RX ORDER — BREAST PUMP
EACH MISCELLANEOUS
Qty: 1 EACH | Refills: 0 | Status: SHIPPED | OUTPATIENT
Start: 2022-03-25 | End: 2022-05-11

## 2022-03-25 NOTE — PROGRESS NOTES
Tracy Koenig is a 26 y.o.  who returns to clinic today for her 6 week post partum  performed at 38w5d on 22 for labor.  She reports that since being seen last she has been doing well.  She is bonding well with her infant and she is breastfeeding.  She has not yet had a menstral cycle.  She is not longer taking any pain medications.  She is participating in her usual activities but has followed the lifting precautions and has not yet resumed intercourse.  Denies breast complaints, abnormal vaginal discharge, urinary symptoms, bowel complaints, or other concerns at this time.    All other systems are reviewed and are negative.    PMH, PSH, SH, and FH reviewed with patient today - changes made in chart.    /84   Wt 57.8 kg (127 lb 6.4 oz)   LMP 2021   BMI 24.88 kg/m²   Breasts: no masses, engorgement, pain or erythema  CVS: RRR  Resp: CTA bilaterally  Abdomen: Abdomen soft, non-tender.  No masses,  No organomegaly  Extremities: no cyanosis, clubbing, no edema    No current facility-administered medications for this visit.       Lab: No results found for this or any previous visit (from the past 48 hour(s)).    [place]: 2    Tracy Koenig is a 26 y.o.  returns to clinic today for her 6 week post partum/post op appointmet after .    #post partum/post op.  Meeting all milestones.  Discussed she may resume intercourse and no longer has any lifting precautions  ?Breastfeeding.  Encouraged continuation of exclusive BF until at least 6 months.   #Cervical cancer screening.  Last pap , wnl    #Post partum depression score. 2  #Post partum birth control method: She was counselled about the various options including OCPs, patch, ring, IUDs, and implant.  --desires  micronor.  #Recommend follow up in a year for annual or sooner if issues or concerns arise.  [facility]

## 2022-05-11 RX ORDER — BREAST PUMP
EACH MISCELLANEOUS
Qty: 1 EACH | Refills: 0 | Status: SHIPPED | OUTPATIENT
Start: 2022-05-11 | End: 2024-03-16

## 2023-02-14 ENCOUNTER — HOSPITAL ENCOUNTER (OUTPATIENT)
Facility: MEDICAL CENTER | Age: 28
End: 2023-02-14
Attending: OBSTETRICS & GYNECOLOGY
Payer: COMMERCIAL

## 2023-02-14 ENCOUNTER — GYNECOLOGY VISIT (OUTPATIENT)
Dept: OBGYN | Facility: CLINIC | Age: 28
End: 2023-02-14
Payer: COMMERCIAL

## 2023-02-14 VITALS — DIASTOLIC BLOOD PRESSURE: 80 MMHG | SYSTOLIC BLOOD PRESSURE: 126 MMHG | BODY MASS INDEX: 22.28 KG/M2 | WEIGHT: 114.1 LBS

## 2023-02-14 DIAGNOSIS — Z30.09 ENCOUNTER FOR COUNSELING REGARDING CONTRACEPTION: ICD-10-CM

## 2023-02-14 DIAGNOSIS — Z12.4 CERVICAL CANCER SCREENING: ICD-10-CM

## 2023-02-14 DIAGNOSIS — Z30.430 ENCOUNTER FOR INSERTION OF MIRENA IUD: ICD-10-CM

## 2023-02-14 PROCEDURE — 99213 OFFICE O/P EST LOW 20 MIN: CPT | Mod: 25 | Performed by: OBSTETRICS & GYNECOLOGY

## 2023-02-14 PROCEDURE — 58300 INSERT INTRAUTERINE DEVICE: CPT | Performed by: OBSTETRICS & GYNECOLOGY

## 2023-02-14 PROCEDURE — 88175 CYTOPATH C/V AUTO FLUID REDO: CPT

## 2023-02-14 NOTE — PROGRESS NOTES
Pt here for Gyn visit  Confirmed good ph#, pharmacy, drug allergy, and medications on file.  LMP:1/25/2023  Last pap:2020, negative.  Last mammo:Never  BCM:None, discuss options  Pt states no other complaints for today.

## 2023-02-14 NOTE — PROCEDURES
IUD Insertion    Date/Time: 2/14/2023 11:54 AM  Performed by: Annabelle Mccauley D.O.  Authorized by: Annabelle Mccauley D.O.     Consent:     Consent obtained:  Verbal and written    Consent given by:  Patient    Procedure risks and benefits discussed: yes      Patient questions answered: yes      Patient agrees, verbalizes understanding, and wants to proceed: yes      Educational handouts given: yes      Instructions and paperwork completed: yes    Procedure:     Pelvic exam performed: yes      Sterile speculum placed in vagina: yes      Cervix visualized: yes      Cervix cleaned and prepped in sterile fashion: yes      Tenaculum applied to cervix: yes      Dilation needed: no      Uterus sounded: yes      Uterus sound depth (cm):  7    IUD type:  Mirena    Strings trimmed: yes    Post-procedure:     Patient tolerated procedure well: yes      Patient will follow up after next period: yes

## 2023-02-14 NOTE — PROGRESS NOTES
GYN visit    CC/reason for visit: birth control consult    HPI: Tracy Koenig is a 27 y.o.  with desire to change method of birth control. She has been on Micronor and found it hard to remember to take within the recommended window. Was also having breakthrough bleeding with it.     ROS:  Reviewed and negative x 10 with pertinent positives listed in HPI above        GYN History:   No h/o abnormal pap, nor history of cone biopsy, LEEP or any other cervical, uterine or gynecologic surgery. No history of sexually transmitted diseases.       OB history:    OB History    Para Term  AB Living   2 2 2 0 0 2   SAB IAB Ectopic Molar Multiple Live Births   0 0 0   0 2      # Outcome Date GA Lbr Antonio/2nd Weight Sex Delivery Anes PTL Lv   2 Term 20 38w6d 10:28 / 00:29 7 lb 2.6 oz F Vag-Spont EPI N SHAWN   1 Term 18 40w5d  7 lb 3 oz  Vag-Spont   SHAWN       Past Medical History:   Diagnosis Date    Environmental allergies     ORAL CONTRACEPTION EVAL PRIO TO METHOE W/WO INITIATION     Oral contraceptive use        History reviewed. No pertinent surgical history.    Medications:   Current Outpatient Medications Ordered in Epic   Medication Sig Dispense Refill    Misc. Devices (BREAST PUMP) Misc Please dispense 1 breast pump for desired lactation.  Duration 1 year. 1 Each 0    norethindrone (MICRONOR) 0.35 MG tablet Take 1 Tablet by mouth every day. 28 Tablet 11    Prenatal Vit-Fe Fumarate-FA (PRENATAL PO) Take  by mouth.      acetaminophen (TYLENOL) 500 MG Tab Take 2 Tablets by mouth every 6 hours. 30 Tablet 0    docusate sodium 100 MG Cap Take 1 capsule by mouth 2 times a day as needed for Constipation. 60 Capsule 0    ibuprofen (MOTRIN) 800 MG Tab Take 1 Tablet by mouth every 8 hours. 30 Tablet 0    Ferrous Gluconate (IRON 27 PO) Take  by mouth.       Current Facility-Administered Medications Ordered in Epic   Medication Dose Route Frequency Provider Last Rate Last Admin    levonorgestrel  (Mirena) 20 MCG/DAY IUD 1 Each  1 Each Intrauterine Once Annabelle Mccauley D.O.           Allergies: Patient has no known allergies.    Social History     Socioeconomic History    Marital status:    Tobacco Use    Smoking status: Never    Smokeless tobacco: Never    Tobacco comments:     avoid all tobacco products   Vaping Use    Vaping Use: Never used   Substance and Sexual Activity    Alcohol use: Not Currently     Alcohol/week: 0.0 oz    Drug use: No    Sexual activity: Yes     Partners: Male       Family History   Problem Relation Age of Onset    Arthritis Mother     Heart Disease Maternal Grandfather     Diabetes Neg Hx     Hypertension Neg Hx     Hyperlipidemia Neg Hx     Stroke Neg Hx     Cancer Neg Hx          Physical Exam:  /80 (BP Location: Right arm, Patient Position: Sitting, BP Cuff Size: Adult)   Wt 114 lb 1.6 oz   BMI 22.28 kg/m²   gen: AAO, NAD, affect appropriate  CV: No edema, cyanosis, or clubbing  Resp: Symmetric non labored breathing  Abd: soft, NT, ND, no masses, no organomegaly, no hernias  : NEFG, normal urethral meatus, normal anus/perineum, normal vagina and cervix.  Uterus midline, anteverted, no adnexal masses/tenderness  Skin: warm/dry, no lesions    A/P: 27 y.o.  with   1. Encounter for insertion of mirena IUD  levonorgestrel (Mirena) 20 MCG/DAY IUD 1 Each    Consent for all Surgical, Special Diagnostic or Therapeutic Procedures      2. Cervical cancer screening  THINPREP PAP,REFLEX HPV ON ASC-US ONLY      3. Encounter for counseling regarding contraception          Contraception counseling     Today we discussed birth control methods, including reversible and permanent methods. The methods we discussed included IUDs and implants as she is breastfeeding and cannot have estrogen containing contraception.   After discussing short and long term methods the patient had all of her questions answered and decided to proceed with Mirena IUD.   It was inserted today- see  procedure note.   Discussed risks of IUD insertion including 3-6 months of abnormal bleeding, infection, and uterine perforation.  Also discussed that if she were to become pregnant it could be an ectopic pregnancy.

## 2023-02-15 DIAGNOSIS — Z12.4 CERVICAL CANCER SCREENING: ICD-10-CM

## 2023-02-15 LAB — CYTOLOGY REG CYTOL: NORMAL

## 2023-03-15 ENCOUNTER — GYNECOLOGY VISIT (OUTPATIENT)
Dept: OBGYN | Facility: CLINIC | Age: 28
End: 2023-03-15
Payer: COMMERCIAL

## 2023-03-15 VITALS — SYSTOLIC BLOOD PRESSURE: 128 MMHG | BODY MASS INDEX: 22.36 KG/M2 | DIASTOLIC BLOOD PRESSURE: 68 MMHG | WEIGHT: 114.5 LBS

## 2023-03-15 DIAGNOSIS — Z30.431 IUD CHECK UP: ICD-10-CM

## 2023-03-15 PROCEDURE — 99212 OFFICE O/P EST SF 10 MIN: CPT | Performed by: OBSTETRICS & GYNECOLOGY

## 2023-03-15 NOTE — PROGRESS NOTES
S: This is a 27 y.o. year old  who is s/p Mirena placement on 23.  She has had some spotting and cramping since the insertion, but currently has no complaints. Denies pelvic pain, heavy vaginal bleeding, or abnormal vaginal discharge.     O: /68 (BP Location: Left arm, Patient Position: Sitting, BP Cuff Size: Adult)   Wt 114 lb 8 oz     GENERAL: Alert, in no apparent distress  PSYCHIATRIC: Appropriate affect, intact insight and judgement.  ABDOMEN: Soft, nontender, nondistended.  No palpable masses.  No rebound or guarding.  No inguinal lymphadenopathy.  No hepatosplenomegaly.  No hernias.    GENITOURINARY:  Normal external genitalia, no lesions.  Normal urethral meatus, no masses or tenderness.  Normal bladder without fullness or masses.  Vagina well estrogenized, no vaginal discharge or lesions.  Cervix without lesions or discharge, nontender. IUD string present at cervical os.   Uterus normal size, shape, and contour, nontender.  Adnexa nontender, no masses.  Normal anus and perineum.    Rectal Exam - not indicated.    ASSESSMENT: s/p Mirena  IUD Placement, in proper position.  No side effects.      PLAN: F/U PRN

## 2023-03-15 NOTE — PROGRESS NOTES
Pt is here for IUD string check  Confirmed good ph#, pharmacy, drug allergy, and medications on file.  LMP:2/23/2023  IUD/ Inserted by/on: Mirena IUD on 2/14/2023  Pt states has been having light spotting since insertion for on and off.  Pt states no other complaints for today.

## 2024-03-04 ENCOUNTER — TELEPHONE (OUTPATIENT)
Dept: OBGYN | Facility: CLINIC | Age: 29
End: 2024-03-04
Payer: COMMERCIAL

## 2024-03-08 ENCOUNTER — HOSPITAL ENCOUNTER (OUTPATIENT)
Dept: LAB | Facility: MEDICAL CENTER | Age: 29
End: 2024-03-08
Attending: OBSTETRICS & GYNECOLOGY
Payer: COMMERCIAL

## 2024-03-08 ENCOUNTER — OFFICE VISIT (OUTPATIENT)
Dept: OBGYN | Facility: CLINIC | Age: 29
End: 2024-03-08
Payer: COMMERCIAL

## 2024-03-08 VITALS — BODY MASS INDEX: 24.76 KG/M2 | SYSTOLIC BLOOD PRESSURE: 132 MMHG | WEIGHT: 126.8 LBS | DIASTOLIC BLOOD PRESSURE: 81 MMHG

## 2024-03-08 DIAGNOSIS — Z30.432 ENCOUNTER FOR IUD REMOVAL: Primary | ICD-10-CM

## 2024-03-08 DIAGNOSIS — O09.899: ICD-10-CM

## 2024-03-08 LAB
B-HCG SERPL-ACNC: 1801 MIU/ML (ref 0–5)
ERYTHROCYTE [DISTWIDTH] IN BLOOD BY AUTOMATED COUNT: 43.1 FL (ref 35.9–50)
HCT VFR BLD AUTO: 42.6 % (ref 37–47)
HGB BLD-MCNC: 14.1 G/DL (ref 12–16)
MCH RBC QN AUTO: 31.5 PG (ref 27–33)
MCHC RBC AUTO-ENTMCNC: 33.1 G/DL (ref 32.2–35.5)
MCV RBC AUTO: 95.1 FL (ref 81.4–97.8)
PLATELET # BLD AUTO: 363 K/UL (ref 164–446)
PMV BLD AUTO: 8.9 FL (ref 9–12.9)
POCT INT CON NEG: NEGATIVE
POCT INT CON POS: POSITIVE
POCT URINE PREGNANCY TEST: POSITIVE
RBC # BLD AUTO: 4.48 M/UL (ref 4.2–5.4)
WBC # BLD AUTO: 11.9 K/UL (ref 4.8–10.8)

## 2024-03-08 PROCEDURE — 99215 OFFICE O/P EST HI 40 MIN: CPT | Performed by: OBSTETRICS & GYNECOLOGY

## 2024-03-08 PROCEDURE — 3079F DIAST BP 80-89 MM HG: CPT | Performed by: OBSTETRICS & GYNECOLOGY

## 2024-03-08 PROCEDURE — 76817 TRANSVAGINAL US OBSTETRIC: CPT | Performed by: OBSTETRICS & GYNECOLOGY

## 2024-03-08 PROCEDURE — 36415 COLL VENOUS BLD VENIPUNCTURE: CPT

## 2024-03-08 PROCEDURE — 81025 URINE PREGNANCY TEST: CPT | Performed by: OBSTETRICS & GYNECOLOGY

## 2024-03-08 PROCEDURE — 84702 CHORIONIC GONADOTROPIN TEST: CPT

## 2024-03-08 PROCEDURE — 3075F SYST BP GE 130 - 139MM HG: CPT | Performed by: OBSTETRICS & GYNECOLOGY

## 2024-03-08 PROCEDURE — 85027 COMPLETE CBC AUTOMATED: CPT

## 2024-03-08 NOTE — PROGRESS NOTES
GYN FOLLOW UP VISIT    CC:  Gynecologic Exam       HPI: Patient is a 28 y.o.  who presents for concerns of a positive pregnancy test with Mirena IUD in place. She had the IUD inserted over 1 year ago on 23.   She reports for past few weeks she began to have irregular spotting and bleeding as well as cramping and pelvic pressure. She normally does have cramping and bleeding monthly with her menses however these last 2-3 weeks has been more irregular and more pain in her pelvis and rectum. She also reported having nausea. Her  suggested she take a pregnancy test which turned out positive a few days ago.     She does not desire any future pregnancies.        ROS:   General: denies fever / chills  HEENT: denies sore throat:  CV: denies chest pain:  Repiratory: denies shortness of breath  GI: denies abdominal pain  : denies dysuria:    PFSH:  I personally reviewed the past medical and surgical histories.       PHYSICAL EXAMINATION:  Vital Signs:   Vitals:    24 1444   BP: 132/81   BP Location: Right arm   Patient Position: Sitting   BP Cuff Size: Adult   Weight: 126 lb 12.8 oz     Body mass index is 24.76 kg/m².    Gen: appears well, NAD  Respiratory: normal effort  Abdomen: Soft, non-tender. No distension or peritoneal signs.   Pelvic Exam:    Vulva: normal.    Urethra: normal.   Vagina: normal.    Cervix: normal. IUD strings visible at os, scant old blood in vault, no active bleeding.    Uterus: normal size, shape and contour.    left adnexa: normal.   right adnexa: normal.   Perineum: normal.    Bedside TVUS - Uterus with IUD in place, left ovary appears normal with small 1 cm cyst and right adnexal cyst noted about 4 cm. No free fluid.  See scanned images.     Urine POCT + pregnancy    ASSESSMENT AND PLAN:  28 y.o.  with pregnancy of unknown location with IUD in place.   Discussed possibility of ectopic pregnancy, early IUP or miscarriage.   Bedside US showing a right adnexal cyst  which could be follicular cyst or ectopic.   Patient appears clinically stable in clinic today with minimal pain.     Discussed option of removal of IUD today vs waiting until workup is completed and diagnosis of pregnancy location confirmed. Discussed that if it is early IUP, IUD may be removed since strings are visible, however, can increase risk of miscarriage. Also informed patient that intrauterine pregnancies can be successful even with IUDs in place, however, increased risk of infection/chorioamnionitis.     She states if this is an early intrauterine pregnancy, she would highly consider termination.   If this is an ectopic pregnancy, discussed possibility of medical vs surgical therapy depending on certain criteria. We would be able to remove IUD at that time.     We will obtain pelvic ultrasound stat, CBC, type and screen and serial bHCGs every 48 hours to observe the trend. Advised patient to go to the lab today.     Strict pelvic rest, lifting precautions and ectopic precautions reviewed with patient. To ER if she has severe pelvic or abdominal pain, heavy vaginal bleeding, N/V.     To Return early next week in clinic for follow up.     Orders Placed This Encounter    US-PELVIC COMPLETE (TRANSABDOMINAL/TRANSVAGINAL) (COMBO)    HCG QUANTITATIVE    COD - Adult (Type and Screen)    CBC WITHOUT DIFFERENTIAL    HCG QUANTITATIVE    POCT Pregnancy          Time spent: 45 minutes    Eric Benjamin M.D.

## 2024-03-10 ENCOUNTER — HOSPITAL ENCOUNTER (EMERGENCY)
Facility: MEDICAL CENTER | Age: 29
End: 2024-03-10
Attending: EMERGENCY MEDICINE
Payer: COMMERCIAL

## 2024-03-10 ENCOUNTER — APPOINTMENT (OUTPATIENT)
Dept: RADIOLOGY | Facility: MEDICAL CENTER | Age: 29
End: 2024-03-10
Attending: EMERGENCY MEDICINE
Payer: COMMERCIAL

## 2024-03-10 VITALS
TEMPERATURE: 98 F | HEIGHT: 60 IN | OXYGEN SATURATION: 91 % | RESPIRATION RATE: 17 BRPM | SYSTOLIC BLOOD PRESSURE: 125 MMHG | BODY MASS INDEX: 24.63 KG/M2 | HEART RATE: 77 BPM | DIASTOLIC BLOOD PRESSURE: 60 MMHG | WEIGHT: 125.44 LBS

## 2024-03-10 VITALS
WEIGHT: 126.76 LBS | HEART RATE: 99 BPM | HEIGHT: 60 IN | SYSTOLIC BLOOD PRESSURE: 125 MMHG | OXYGEN SATURATION: 100 % | BODY MASS INDEX: 24.89 KG/M2 | TEMPERATURE: 98 F | DIASTOLIC BLOOD PRESSURE: 66 MMHG | RESPIRATION RATE: 20 BRPM

## 2024-03-10 DIAGNOSIS — O00.90 ECTOPIC PREGNANCY WITHOUT INTRAUTERINE PREGNANCY, UNSPECIFIED LOCATION: ICD-10-CM

## 2024-03-10 DIAGNOSIS — Z32.00 ENCOUNTER FOR ASSESSMENT FOR SUSPECTED ECTOPIC PREGNANCY: ICD-10-CM

## 2024-03-10 LAB
ALBUMIN SERPL BCP-MCNC: 4.6 G/DL (ref 3.2–4.9)
ALBUMIN/GLOB SERPL: 1.4 G/DL
ALP SERPL-CCNC: 66 U/L (ref 30–99)
ALT SERPL-CCNC: 10 U/L (ref 2–50)
ANION GAP SERPL CALC-SCNC: 14 MMOL/L (ref 7–16)
AST SERPL-CCNC: 15 U/L (ref 12–45)
B-HCG SERPL-ACNC: 2435 MIU/ML (ref 0–10)
BASOPHILS # BLD AUTO: 0.5 % (ref 0–1.8)
BASOPHILS # BLD: 0.05 K/UL (ref 0–0.12)
BILIRUB SERPL-MCNC: 0.3 MG/DL (ref 0.1–1.5)
BUN SERPL-MCNC: 9 MG/DL (ref 8–22)
CALCIUM ALBUM COR SERPL-MCNC: 8.6 MG/DL (ref 8.5–10.5)
CALCIUM SERPL-MCNC: 9.1 MG/DL (ref 8.4–10.2)
CHLORIDE SERPL-SCNC: 99 MMOL/L (ref 96–112)
CO2 SERPL-SCNC: 23 MMOL/L (ref 20–33)
CREAT SERPL-MCNC: 0.5 MG/DL (ref 0.5–1.4)
EOSINOPHIL # BLD AUTO: 0.08 K/UL (ref 0–0.51)
EOSINOPHIL NFR BLD: 0.8 % (ref 0–6.9)
ERYTHROCYTE [DISTWIDTH] IN BLOOD BY AUTOMATED COUNT: 41.9 FL (ref 35.9–50)
GFR SERPLBLD CREATININE-BSD FMLA CKD-EPI: 129 ML/MIN/1.73 M 2
GLOBULIN SER CALC-MCNC: 3.3 G/DL (ref 1.9–3.5)
GLUCOSE SERPL-MCNC: 114 MG/DL (ref 65–99)
HCT VFR BLD AUTO: 40.1 % (ref 37–47)
HGB BLD-MCNC: 13.7 G/DL (ref 12–16)
IMM GRANULOCYTES # BLD AUTO: 0.04 K/UL (ref 0–0.11)
IMM GRANULOCYTES NFR BLD AUTO: 0.4 % (ref 0–0.9)
LYMPHOCYTES # BLD AUTO: 2.3 K/UL (ref 1–4.8)
LYMPHOCYTES NFR BLD: 23.1 % (ref 22–41)
MCH RBC QN AUTO: 32.1 PG (ref 27–33)
MCHC RBC AUTO-ENTMCNC: 34.2 G/DL (ref 32.2–35.5)
MCV RBC AUTO: 93.9 FL (ref 81.4–97.8)
MONOCYTES # BLD AUTO: 0.41 K/UL (ref 0–0.85)
MONOCYTES NFR BLD AUTO: 4.1 % (ref 0–13.4)
NEUTROPHILS # BLD AUTO: 7.09 K/UL (ref 1.82–7.42)
NEUTROPHILS NFR BLD: 71.1 % (ref 44–72)
NRBC # BLD AUTO: 0 K/UL
NRBC BLD-RTO: 0 /100 WBC (ref 0–0.2)
PLATELET # BLD AUTO: 352 K/UL (ref 164–446)
PMV BLD AUTO: 8.6 FL (ref 9–12.9)
POTASSIUM SERPL-SCNC: 3.4 MMOL/L (ref 3.6–5.5)
PROT SERPL-MCNC: 7.9 G/DL (ref 6–8.2)
RBC # BLD AUTO: 4.27 M/UL (ref 4.2–5.4)
SODIUM SERPL-SCNC: 136 MMOL/L (ref 135–145)
WBC # BLD AUTO: 10 K/UL (ref 4.8–10.8)

## 2024-03-10 PROCEDURE — 85025 COMPLETE CBC W/AUTO DIFF WBC: CPT

## 2024-03-10 PROCEDURE — 99283 EMERGENCY DEPT VISIT LOW MDM: CPT | Performed by: STUDENT IN AN ORGANIZED HEALTH CARE EDUCATION/TRAINING PROGRAM

## 2024-03-10 PROCEDURE — 76801 OB US < 14 WKS SINGLE FETUS: CPT

## 2024-03-10 PROCEDURE — 99285 EMERGENCY DEPT VISIT HI MDM: CPT

## 2024-03-10 PROCEDURE — 80053 COMPREHEN METABOLIC PANEL: CPT

## 2024-03-10 PROCEDURE — 84702 CHORIONIC GONADOTROPIN TEST: CPT

## 2024-03-10 PROCEDURE — 36415 COLL VENOUS BLD VENIPUNCTURE: CPT

## 2024-03-10 PROCEDURE — 700111 HCHG RX REV CODE 636 W/ 250 OVERRIDE (IP): Mod: JZ | Performed by: STUDENT IN AN ORGANIZED HEALTH CARE EDUCATION/TRAINING PROGRAM

## 2024-03-10 PROCEDURE — 700105 HCHG RX REV CODE 258: Performed by: EMERGENCY MEDICINE

## 2024-03-10 PROCEDURE — 99284 EMERGENCY DEPT VISIT MOD MDM: CPT

## 2024-03-10 PROCEDURE — 96401 CHEMO ANTI-NEOPL SQ/IM: CPT

## 2024-03-10 RX ORDER — IBUPROFEN 200 MG
200 TABLET ORAL EVERY 6 HOURS PRN
Status: SHIPPED | COMMUNITY
End: 2024-03-16

## 2024-03-10 RX ORDER — SODIUM CHLORIDE 9 MG/ML
1000 INJECTION, SOLUTION INTRAVENOUS ONCE
Status: COMPLETED | OUTPATIENT
Start: 2024-03-10 | End: 2024-03-10

## 2024-03-10 RX ORDER — LEVONORGESTREL 52 MG/1
1 INTRAUTERINE DEVICE INTRAUTERINE ONCE
Status: SHIPPED | COMMUNITY
End: 2024-03-16

## 2024-03-10 RX ORDER — ACETAMINOPHEN 500 MG
500 TABLET ORAL 2 TIMES DAILY PRN
Status: SHIPPED | COMMUNITY
End: 2024-03-16

## 2024-03-10 RX ADMIN — SODIUM CHLORIDE 1000 ML: 9 INJECTION, SOLUTION INTRAVENOUS at 22:39

## 2024-03-10 RX ADMIN — METHOTREXATE 77.5 MG: 25 SOLUTION INTRA-ARTERIAL; INTRAMUSCULAR; INTRATHECAL; INTRAVENOUS at 22:14

## 2024-03-10 ASSESSMENT — FIBROSIS 4 INDEX: FIB4 SCORE: 0.4

## 2024-03-10 NOTE — ED PROVIDER NOTES
"  ER Provider Note    Scribed for Joe Vela M.d. by Nevin Moran. 3/10/2024  3:07 PM    Primary Care Provider: Octavia Shah M.D.    CHIEF COMPLAINT  Chief Complaint   Patient presents with    Pelvic Pain     X1 week    Pregnancy     Unknown how many weeks. 1st day LMP 1/20/2024. IUD in place. Had previous HCG level drawn and states this result was \"1801\". Pt. Reports also having transabdominal US at OB office \"and they found masses on my ovaries but I have cysts. They weren't sure if I have an ectopic pregnancy\".     Vaginal Bleeding     X4 weeks. \"Very light spotting\". Reports changing menstrual pads 1x/day.     EXTERNAL RECORDS REVIEWED  Hospital records reviewed showed a recent visit to her OB-GYN which showed an ultrasound with an IUD in place and a positive pregnancy test. Ultrasound showed a pregnancy of an unknown location and a cyst on her ovary. Her OB doctor's concerns were for ectopic pregnancy, miscarriage or cyst. The patient had previously reported that if it is an early pregnancy then she would like termination or treatment if it is an ectopic pregnancy. Her OB recommended removal of the IUD to reduce risk fo infection and repeat blood work in 48 hours.   HPI/ROS    OUTSIDE HISTORIAN(S):  None    Tracy Koenig is a 28 y.o. female who presents to the ED complaining of pelvic pain onset one week ago. Patient has had vaginal bleeding for four weeks. She had a positive pregnancy test but is unsure how many weeks she is. Her last menstrual bleeding was 1/20/24 and she has an IUD in place. Patient was seen by her OB on 3/8 and had a beta-HCG level of 1801. Her OB recommended that the patient present to the ED for STAT lab work to check her beta-HCG levels. Previous blood work showed that she was RH positive. Since the patient left her OB's office on Friday, she had has less bleeding and pain since she left the office. Patient describes her bleeding as light to spotting. Denies history " of elevated blood pressure but adds that she is nervous about the situation. Patient has a follow up with her OB 3/12.     PAST MEDICAL HISTORY  Past Medical History:   Diagnosis Date    Environmental allergies     ORAL CONTRACEPTION EVAL PRIO TO METHOE W/WO INITIATION     Oral contraceptive use        SURGICAL HISTORY  History reviewed. No pertinent surgical history.    FAMILY HISTORY  Family History   Problem Relation Age of Onset    Arthritis Mother     Heart Disease Maternal Grandfather     Diabetes Neg Hx     Hypertension Neg Hx     Hyperlipidemia Neg Hx     Stroke Neg Hx     Cancer Neg Hx        SOCIAL HISTORY   reports that she has never smoked. She has never used smokeless tobacco. She reports current alcohol use. She reports that she does not use drugs.    CURRENT MEDICATIONS  Previous Medications    ACETAMINOPHEN (TYLENOL) 500 MG TAB    Take 500 mg by mouth 2 times a day as needed. Indications: Pain    IBUPROFEN (MOTRIN) 200 MG TAB    Take 200 mg by mouth every 6 hours as needed. Indications: Pain    LEVONORGESTREL (MIRENA, 52 MG,) 20 MCG/DAY IUD    1 Each by Intrauterine route one time. Last placed on 2/2023    [facility]. DEVICES (BREAST PUMP) [facility]    Please dispense 1 breast pump for desired lactation.  Duration 1 year.    PRENATAL VIT-FE FUMARATE-FA (PRENATAL PO)    Take 1 Tablet by mouth every evening.       ALLERGIES  Patient has no known allergies.    PHYSICAL EXAM  BP (!) 156/93   Pulse 88   Temp 36.7 °C (98 °F) (Temporal)   Resp 20   Ht 1.524 m (5')   Wt 57.5 kg (126 lb 12.2 oz)   LMP 01/20/2024 (Approximate)   SpO2 100%   Breastfeeding Yes   BMI 24.76 kg/m²   Constitutional: Alert in no apparent distress.  HENT: Normocephalic, Atraumatic, Bilateral external ears normal. Nose normal.   Eyes: Pupils are equal and reactive. Conjunctiva normal, non-icteric.   Heart: Regular rate and rythm, no murmurs.    Lungs: Clear to auscultation bilaterally.  Skin: Warm, Dry, No erythema, No rash.    Neurologic: Alert, Grossly non-focal.   Psychiatric: Affect normal, Judgment normal, Mood normal, Appears appropriate and not intoxicated.      DIAGNOSTIC STUDIES    Labs:   Labs Reviewed   CBC WITH DIFFERENTIAL - Abnormal; Notable for the following components:       Result Value    MPV 8.6 (*)     All other components within normal limits   COMP METABOLIC PANEL - Abnormal; Notable for the following components:    Potassium 3.4 (*)     Glucose 114 (*)     All other components within normal limits   HCG QUANTITATIVE - Abnormal; Notable for the following components:    Bhcg 2435.0 (*)     All other components within normal limits   ESTIMATED GFR   URINALYSIS,CULTURE IF INDICATED      Radiology:   The attending emergency physician has independently interpreted the diagnostic imaging associated with this visit and am waiting the final reading from the radiologist.   Preliminary interpretation is a follows: no IUP  Radiologist interpretation:   US-OB 1ST TRIMESTER WITH TRANSVAGINAL (COMBO)   Final Result      1.  No intrauterine gestational sac seen. Recommend correlation with clinical presentation and quantitative beta hCG. If the patient does have a positive pregnancy test, differential includes early pregnancy, complete , and ectopic pregnancy.    Followup may be obtained with repeat ultrasound and/or serial quantitative beta hCG as clinically directed.   2.  Intrauterine device is in place.   3.  Bilateral complex ovarian cysts measuring up to 4.4 cm on the right and 4.7 cm on the left. Recommend short-term follow-up to assess resolution.           COURSE & MEDICAL DECISION MAKING     ED Observation Status? No; Patient does not meet criteria for ED Observation.     INITIAL ASSESSMENT, COURSE AND PLAN  Care Narrative:   3:08 PM - Patient seen and examined at bedside. Patient is a 28 year old female who presents with vaginal bleeding, and pelvic pain onset one week ago. Discussed plan of care, including a  diagnostic work up with labs to check her beta-HCG level. Patient agrees to the plan of care. Ordered for US-OB 1st Trimester transvaginally only, CBC w/ Diff, CMP, HCG Quantitative, UA Culture if Indicated to evaluate her symptoms.      28 y.o. F Rh+ w/ IUD and +Preg test p/w pelvic      DDx includes but is not limited to :  Given concern for positive pregnancy test with increasing quant and no IUP plan for transfer to main for concern for ectopic pregnancy and consideration for methotrexate versus surgery    4:54 PM - I discussed the patient's case and the above findings with Dr. Rosas (OB-GYN) who recommended treatment with methotrexate or a surgical option. Patient was reevaluated at bedside. I informed her of the elevated beta-HCG level with an ectopic pregnancy as well as Dr. Rosas recommendation. As we do not have the needed medication at [facility] she will be transferred to [facility] ED. Patient agrees to the plan of care and was allowed to ask questions at this time.          Dr. Pelayo excepted patient's transfer to ED    DISPOSITION AND DISCUSSIONS  I have discussed management of the patient with the following physicians and RAJ's:  Dr. Rosas (OB-GYN)     Discussion of management with other [facility] or appropriate source(s): None     Barriers to care at this time, including but not limited to:  None .     DISPOSITION:  Patient will be transferred to [facility] ED in guarded condition via private vehicle.     FINAL DIANGOSIS  1. Ectopic pregnancy without intrauterine pregnancy, unspecified location       Nevin GARNER ([place]), am scribing for, and in the presence of, Joe Vela M.D..    Electronically signed by: Nevin Moran ([place]), 3/10/2024    Joe GARNER M.D. personally performed the services described in this documentation, as scribed by Nevin Moran in my presence, and it is both accurate and complete.      The note accurately reflects work and decisions made by me.   Joe Vela M.D.  3/10/2024  9:04 PM

## 2024-03-10 NOTE — ED TRIAGE NOTES
"Chief Complaint   Patient presents with    Pelvic Pain     X1 week    Pregnancy     Unknown how many weeks. 1st day LMP 1/20/2024. IUD in place. Had previous HCG level drawn and states this result was \"1801\". Pt. Reports also having transabdominal US at OB office \"and they found masses on my ovaries but I have cysts. They weren't sure if I have an ectopic pregnancy\".     Vaginal Bleeding     X4 weeks. \"Very light spotting\". Reports changing menstrual pads 1x/day.     Physical Exam  Pulmonary:      Effort: Pulmonary effort is normal.   Skin:     General: Skin is warm and dry.   Neurological:      Mental Status: She is alert.         "

## 2024-03-11 ENCOUNTER — HOSPITAL ENCOUNTER (OUTPATIENT)
Dept: RADIOLOGY | Facility: MEDICAL CENTER | Age: 29
End: 2024-03-11
Attending: OBSTETRICS & GYNECOLOGY
Payer: COMMERCIAL

## 2024-03-11 NOTE — ED PROVIDER NOTES
ER Provider Note    Scribed for Dr. Mary Srinivasan D.O. by Kyleigh Ansari. 3/10/2024  6:50 PM    Primary Care Provider: Octavia Shah M.D.    CHIEF COMPLAINT  Chief Complaint   Patient presents with    Sent by MD     Pt is sent from [facility] ED by Dr. Vela to receive methotrexate. Pt was dx with an ectopic pregnancy.        EXTERNAL RECORDS REVIEWED  Hospital records reviewed showed a recent visit to her OB-GYN which showed an ultrasound with an IUD in place and a positive pregnancy test. Results from [facility]'s ED today report a Positive HCG but no intrauterine gestation sac with no evidence of bleeding.    HPI/ROS  LIMITATION TO HISTORY   Select: : None    OUTSIDE HISTORIAN(S):  None    Tracy Koenig is a 28 y.o. female  who presents to the ED for ectopic pregnancy. Patient reports she comes from [facility] ED with a diagnosed ectopic pregnancy, intrauterine pregnancy was unable to be visualized, therefore she was sent here for  methotrexate. She notes that this is her third pregnancy, and she has two children. She reports Dr. Vela at [facility] spoke to gynecologist on call and they requested she be sent here for treatment.  Her last meal was about one hour ago. She reports that she has a history of ovarian cysts. She reports bilateral lower abdominal pain. Her OB is Annabelle Mccauley with [facility].     PAST MEDICAL HISTORY  Past Medical History:   Diagnosis Date    Environmental allergies     ORAL CONTRACEPTION EVAL PRIO TO METHOE W/WO INITIATION     Oral contraceptive use        SURGICAL HISTORY  No past surgical history noted    FAMILY HISTORY  Family History   Problem Relation Age of Onset    Arthritis Mother     Heart Disease Maternal Grandfather     Diabetes Neg Hx     Hypertension Neg Hx     Hyperlipidemia Neg Hx     Stroke Neg Hx     Cancer Neg Hx        SOCIAL HISTORY   reports that she has never smoked. She has never used smokeless tobacco. She reports current  alcohol use. She reports that she does not use drugs.    CURRENT MEDICATIONS  Previous Medications    ACETAMINOPHEN (TYLENOL) 500 MG TAB    Take 500 mg by mouth 2 times a day as needed. Indications: Pain    IBUPROFEN (MOTRIN) 200 MG TAB    Take 200 mg by mouth every 6 hours as needed. Indications: Pain    LEVONORGESTREL (MIRENA, 52 MG,) 20 MCG/DAY IUD    1 Each by Intrauterine route one time. Last placed on 2/2023    [facility]BREAST [place]MP[place] [facility]    Please dispense 1 breast pump for desired lactation.  Duration 1 year.    PRENATAL VIT-FE FUMARATE-FA (PRENATAL PO)    Take 1 Tablet by mouth every evening.       ALLERGIES  Patient has no known allergies.    PHYSICAL EXAM  /63   Pulse (!) 105   Temp 36.8 °C (98.2 °F) (Temporal)   Resp 18   Ht 1.524 m (5')   Wt 56.9 kg (125 lb 7.1 oz)   LMP 01/20/2024 (Approximate)   SpO2 97%   BMI 24.50 kg/m²   Constitutional: Patient is well developed, well nourished. Non-toxic appearing. No acute distress.   HENT: Normocephalic, moist oral mucosa, nares are patent and clear.  Cardiovascular: Normal heart rate and Regular rhythm. No murmur,   Thorax & Lungs: Clear and equal breath sounds with good excursion. No respiratory distress, no rhonchi, wheezing or rales.   Abdomen: Bowel sounds normal in all four quadrants. Soft,nontender, no rebound , guarding, palpable masses.   Skin: Warm, Dry   Extremities: Peripheral pulses 4/4 No tenderness.     Neurologic: Alert & oriented x 3, Normal motor function, Normal sensory function,   Psychiatric: Affect normal, Judgment normal, Mood normal.       COURSE & MEDICAL DECISION MAKING    ED Observation Status? No; Patient does not meet criteria for ED Observation.     INITIAL ASSESSMENT AND PLAN  Care Narrative:       6:50 PM - Patient seen and evaluated at bedside. Results from [facility] ED today report a Positive HCG but no intrauterine gestation sac with no evidence of bleeding. Discussed plan of care, including consult with  OB for medication and plan of care. Patient agrees to plan of care. Paged OBGYN.    7:11 PM I discussed the patient's case and the above findings with Dr. Rosas (OBGYN) who is coming down to see the patient, advises to medicate the patient with methotrexate, advised the patient go to labs to get a quantitative HCG in 72 hours, and repeat again in 7 days while at the Emergency Room.     10:44 PM - Patient medicated with methotrexate 77.5 mg.  Patient had a vasovagal response and is now receiving 1 L of NS.    1045 PM I discussed plan of care including follow up on labs with the patient. Noted the patient's exam and vitals at this time are reassuring. Discussed plan for discharge; I advised the patient to return to [facility] ED with any new or worsening symptoms. Patient was given the opportunity to ask any questions. I addressed all questions or concerns and the patient is stable for discharge at this time. Patient verbalizes understanding and agreement to this plan of care.                        DISPOSITION AND DISCUSSIONS  I have discussed management of the patient with the following physicians and RAJ's: Dr. Rosas (OBGYN)    Discussion of management with other [facility] or appropriate source(s): None     Escalation of care considered, and ultimately not performed: acute inpatient care management, however at this time, the patient is most appropriate for outpatient management.    Barriers to care at this time, including but not limited to:  None .     Decision tools and prescription drugs considered including, but not limited to: None.     The patient will return for new or worsening symptoms and is stable at the time of discharge.    The patient is referred to a primary physician for blood pressure management, diabetic screening, and for all other preventative health concerns.    DISPOSITION:  Patient will be discharged home in stable condition.    FOLLOW UP:  Bogdan Rosas D.O.  [address]  [id number]  [phone]    Schedule an appointment as soon as possible for a visit in 3 days  As needed, If symptoms worsen      FINAL IMPRESSION   1. Ectopic pregnancy without intrauterine pregnancy, unspecified location       Kyleigh GARNER ([place]), am scribing for, and in the presence of, Mary Srinivasan D.O..    Electronically signed by: Kyleigh Ansari ([place]), 3/10/2024    IMary D.O. personally performed the services described in this documentation, as scribed by Kyleigh Ansari in my presence, and it is both accurate and complete.    The note accurately reflects work and decisions made by me.  Mary Srinivasan D.O.  3/11/2024  1:23 AM

## 2024-03-11 NOTE — ED NOTES
Pt to go POV to [facility] ER, pt report called to ER charge SALLY Ledesma given to Pt to present to ER on arrival, PIV in place Ok'd per ERP

## 2024-03-11 NOTE — ED NOTES
"Pharmacy Methotrexate for Ectopic Pregnancy Calculation Note       Wt 56.9 kg Ht 60 in  BSA ([place]) 1.55 m2    Pertinent Labs:  SCr 0.5  T. Bili 0.3, AST 15, ALT 10    Dx: Ectopic Pregnancy  Usual dose: Methotrexate 50 mg/m2 IM x 1 dose   Reference:   Nathan CASTRO, \"Clinical Practice. Ectopic Pregnancy,\" N Engl J Med, 2009, 361(4):379-87.      Calculations:  Methotrexate 50 mg/m2 x 1.55 m2 = 77.5 mg  Final Dose = 77.5 mg IM   Final Volume in syringe using Methotrexate 25 mg/mL injection = 3.1 mL   (Split volumes > 3 ml into two syringes)  Final product will be #2 syringes of 1.55 mL each    Bong Swan PharmD     Second calculation done to verify dose    Pharmacy Methotrexate for Ectopic Pregnancy Calculation Note       Wt 56.9 kg Ht 60 in  BSA ([place]) 1.55 m2    Calculations:  Methotrexate 50 mg/m2 x 1.55 m2 = 77.5 mg  Final Dose = 77.5 mg IM   Final Volume in syringe using Methotrexate 25 mg/mL injection = 3.1 mL   (Split volumes > 3 ml into two syringes)    Ko Ingram, LocD, BCPS  "

## 2024-03-11 NOTE — DISCHARGE INSTRUCTIONS
Please discuss the following findings with your regular doctor:  US-OB 1ST TRIMESTER WITH TRANSVAGINAL (COMBO)   Final Result      1.  No intrauterine gestational sac seen. Recommend correlation with clinical presentation and quantitative beta hCG. If the patient does have a positive pregnancy test, differential includes early pregnancy, complete , and ectopic pregnancy.    Followup may be obtained with repeat ultrasound and/or serial quantitative beta hCG as clinically directed.   2.  Intrauterine device is in place.   3.  Bilateral complex ovarian cysts measuring up to 4.4 cm on the right and 4.7 cm on the left. Recommend short-term follow-up to assess resolution.        Labs Reviewed   CBC WITH DIFFERENTIAL - Abnormal; Notable for the following components:       Result Value    MPV 8.6 (*)     All other components within normal limits   COMP METABOLIC PANEL - Abnormal; Notable for the following components:    Potassium 3.4 (*)     Glucose 114 (*)     All other components within normal limits   HCG QUANTITATIVE - Abnormal; Notable for the following components:    Bhcg 2435.0 (*)     All other components within normal limits   ESTIMATED GFR   URINALYSIS,CULTURE IF INDICATED

## 2024-03-11 NOTE — ED NOTES
Pt noted to likely have a vagal response (pale, diaphoretic, lightheaded, nauseous) to injections. Pt provided water, crackers, juice and ice pack to help with symptoms. ERP brought to bedside and liter of NS ordered. Fluids infusing now.

## 2024-03-11 NOTE — DISCHARGE INSTRUCTIONS
Follow-up for repeat quantitative beta-hCG on Wednesday at any outpatient [facility] laboratory.  Follow-up for repeat quantitative beta-hCG on Saturday here at [facility] so that if you need a second dose of methotrexate you will be in the correct facility.  If you have any problems please return immediately to the emergency department

## 2024-03-11 NOTE — ED TRIAGE NOTES
Chief Complaint   Patient presents with    Sent by MD     Pt is sent from [facility] ED by Dr. Vela to receive methotrexate. Pt was dx with an ectopic pregnancy.      BP (!) 140/75   Pulse (!) 125   Temp 36.8 °C (98.2 °F) (Temporal)   Resp 16   Ht 1.524 m (5')   Wt 56.9 kg (125 lb 7.1 oz)   LMP 01/20/2024 (Approximate)   SpO2 99%   BMI 24.50 kg/m²

## 2024-03-11 NOTE — ED NOTES
Pt fluid bolus completed. Pt ambulates to and from restroom with steady gait and without return of symptoms. Pt reports she feels completely normal at this time.    Pt educated on discharge instructions. All questions & concerns addressed. Vitals re-assessed and at pt's baseline. Pt ambulates to exit with steady gait and all belongings.    IV d/c'd

## 2024-03-11 NOTE — CONSULTS
GYN Consult    CC/reason for consult: positive HCG with IUD in place    HPI: Tracy Koenig is a 28 y.o.  LMP 2024 with pregnancy of unknown location and Mirena IUD in place.  Was seen in our clinic on 2024 with Dr. Benjamin and presented to ED for repeat HCG quant.  Lab and ultrasound were performed at [facility] and notable for abnormal rise in HCG quant and no intrauterine gestation.  Patient notes some improvement in her pain since seeing Dr. Benjamin, but still has very mild 2-3/10 dull lower abdominal pain.  Vaginal bleeding is period-like for her but with her IUD she notes that her periods are very light.  Has to change her pad 1-2 times a day.  Bleeding has been ongoing for about a month.      ROS:  Constitutional: No fever, weight loss, weight gain, or fatigue  Skin/breast: No breast lump, nipple discharge, acne  Cardiovascular: No chest pain, leg swelling, palpitations  Respiratory: No shortness of breath, wheezing, or cough  GI: No diarrhea, constipation, blood in stool, vomiting, abdominal pain  Endocrine: No hot flashes, abnormal thirst  Psychiatric: No depression, anxiety, or thoughts of self-harm  Neurologic: No headaches or seizures  Heme/lymph: No enlarged lymph nodes, denies bruising/bleeding that will not stop  Allergic: Denies allergies  MSK: Denies muscle weakness        GYN History:  LMP 2024. No history of sexually transmitted diseases. No history of abnormal paps, most recent pap in 2023.       OB history:    OB History    Para Term  AB Living   2 2 2 0 0 2   SAB IAB Ectopic Molar Multiple Live Births   0 0 0   0 2      # Outcome Date GA Lbr Antonio/2nd Weight Sex Delivery Anes PTL Lv   2 Term 22 38w6d 10:28 / 00:29 3.25 kg (7 lb 2.6 oz) F Vag-Spont EPI N SHAWN   1 Term 18 40w5d  3.26 kg (7 lb 3 oz)  Vag-Spont   SHAWN       Past Medical History:   Diagnosis Date    Environmental allergies     ORAL CONTRACEPTION EVAL PRIO TO METHOE W/WO  INITIATION     Oral contraceptive use        No past surgical history on file.    Medications:   Current Facility-Administered Medications Ordered in Epic   Medication Dose Route Frequency Provider Last Rate Last Admin    methotrexate PF 77.5 mg in syringe 3.1 mL Chemotherapy  50 mg/m2 Intramuscular Once Bogdan Rosas D.O.         Current Outpatient Medications Ordered in Epic   Medication Sig Dispense Refill    levonorgestrel (MIRENA, 52 MG,) 20 MCG/DAY IUD 1 Each by Intrauterine route one time. Last placed on 2/2023      acetaminophen (TYLENOL) 500 MG Tab Take 500 mg by mouth 2 times a day as needed. Indications: Pain      ibuprofen (MOTRIN) 200 MG Tab Take 200 mg by mouth every 6 hours as needed. Indications: Pain      Misc. Devices (BREAST PUMP) Misc Please dispense 1 breast pump for desired lactation.  Duration 1 year. 1 Each 0    Prenatal Vit-Fe Fumarate-FA (PRENATAL PO) Take 1 Tablet by mouth every evening.         Allergies: Patient has no known allergies.    Social History     Socioeconomic History    Marital status:    Tobacco Use    Smoking status: Never    Smokeless tobacco: Never    Tobacco comments:     avoid all tobacco products   Vaping Use    Vaping Use: Never used   Substance and Sexual Activity    Alcohol use: Yes     Comment: occ    Drug use: No    Sexual activity: Yes     Partners: Male     Birth control/protection: None       Family History   Problem Relation Age of Onset    Arthritis Mother     Heart Disease Maternal Grandfather     Diabetes Neg Hx     Hypertension Neg Hx     Hyperlipidemia Neg Hx     Stroke Neg Hx     Cancer Neg Hx        Physical Exam:  /60   Pulse 99   Temp 36.8 °C (98.2 °F) (Temporal)   Resp 18   Ht 1.524 m (5')   Wt 56.9 kg (125 lb 7.1 oz)   LMP 01/20/2024 (Approximate)   SpO2 92%   BMI 24.50 kg/m²   gen: AAO, NAD, affect appropriate  Neck: non-tender, no masses  CV: RRR; no LE edema  Resp: Symmetric non labored breathing  Abd: soft, NT, ND, no  masses, no organomegaly, no hernias  : Performed in clinic ; not repeated today  Skin: warm/dry, no lesions    A/P: 28 y.o.  with abnormally rising hcg quant and Mirena IUD in place.  No intrauterine pregnancy noted on ultrasound today.     -- Discussed results with patient today; only 34% increase in quantitative hcg which is abnormally rising.  This, along with Mirena IUD in place and no IUP seen on ultrasound likely means an ectopic pregnancy.  I reviewed with her that because we do not definitively see an ectopic pregnancy on her imaging, that this is a presumed ectopic pregnancy.  We discussed expectant vs medical vs surgical management. She strongly desires medical management with methotrexate today.  Explained risks/benefits/alternatives/indications for methotrexate therapy for presumed ectopic.  Discussed common adverse reactions as well as warning signs for ruptured ectopic pregnancy.  Reviewed strict return precautions.  Labs were ordered for her to go to outpatient lab on day #4 (Wednesday Mar 13).  She was instructed to return to ED for repeat labs on day #7 (Saturday Mar 16).  Patient is aware that her day #4 hcg quant may increase and that our goal is a 15% decrease between day #4 hcg quant and day #7 hcg quant. All questions were answered.    Bogdan Rosas D.O.

## 2024-03-11 NOTE — ED NOTES
Assist RN: Pt medicated per MAR, educated on medications. Verbalized understanding. All chemo precautions and PPE applied appropriately. Dual RN sign off.

## 2024-03-12 ENCOUNTER — APPOINTMENT (OUTPATIENT)
Dept: OBGYN | Facility: CLINIC | Age: 29
End: 2024-03-12
Payer: COMMERCIAL

## 2024-03-13 ENCOUNTER — HOSPITAL ENCOUNTER (OUTPATIENT)
Dept: LAB | Facility: MEDICAL CENTER | Age: 29
End: 2024-03-13
Attending: STUDENT IN AN ORGANIZED HEALTH CARE EDUCATION/TRAINING PROGRAM
Payer: COMMERCIAL

## 2024-03-13 DIAGNOSIS — Z32.00 ENCOUNTER FOR ASSESSMENT FOR SUSPECTED ECTOPIC PREGNANCY: ICD-10-CM

## 2024-03-13 LAB — B-HCG SERPL-ACNC: 2835 MIU/ML (ref 0–5)

## 2024-03-13 PROCEDURE — 84702 CHORIONIC GONADOTROPIN TEST: CPT

## 2024-03-13 PROCEDURE — 36415 COLL VENOUS BLD VENIPUNCTURE: CPT

## 2024-03-16 ENCOUNTER — HOSPITAL ENCOUNTER (EMERGENCY)
Facility: MEDICAL CENTER | Age: 29
End: 2024-03-16
Attending: EMERGENCY MEDICINE
Payer: COMMERCIAL

## 2024-03-16 ENCOUNTER — ANESTHESIA EVENT (OUTPATIENT)
Dept: SURGERY | Facility: MEDICAL CENTER | Age: 29
End: 2024-03-16
Payer: COMMERCIAL

## 2024-03-16 ENCOUNTER — ANESTHESIA (OUTPATIENT)
Dept: SURGERY | Facility: MEDICAL CENTER | Age: 29
End: 2024-03-16
Payer: COMMERCIAL

## 2024-03-16 ENCOUNTER — APPOINTMENT (OUTPATIENT)
Dept: RADIOLOGY | Facility: MEDICAL CENTER | Age: 29
End: 2024-03-16
Attending: EMERGENCY MEDICINE
Payer: COMMERCIAL

## 2024-03-16 VITALS
TEMPERATURE: 97.5 F | WEIGHT: 125.66 LBS | RESPIRATION RATE: 15 BRPM | OXYGEN SATURATION: 95 % | HEART RATE: 92 BPM | DIASTOLIC BLOOD PRESSURE: 65 MMHG | SYSTOLIC BLOOD PRESSURE: 124 MMHG | BODY MASS INDEX: 24.67 KG/M2 | HEIGHT: 60 IN

## 2024-03-16 DIAGNOSIS — G89.18 ACUTE POST-OPERATIVE PAIN: ICD-10-CM

## 2024-03-16 DIAGNOSIS — O00.90 RUPTURED ECTOPIC PREGNANCY: ICD-10-CM

## 2024-03-16 LAB — B-HCG SERPL-ACNC: 3138 MIU/ML (ref 0–5)

## 2024-03-16 PROCEDURE — A9270 NON-COVERED ITEM OR SERVICE: HCPCS | Performed by: ANESTHESIOLOGY

## 2024-03-16 PROCEDURE — 160039 HCHG SURGERY MINUTES - EA ADDL 1 MIN LEVEL 3: Performed by: STUDENT IN AN ORGANIZED HEALTH CARE EDUCATION/TRAINING PROGRAM

## 2024-03-16 PROCEDURE — 700111 HCHG RX REV CODE 636 W/ 250 OVERRIDE (IP): Performed by: ANESTHESIOLOGY

## 2024-03-16 PROCEDURE — 160048 HCHG OR STATISTICAL LEVEL 1-5: Performed by: STUDENT IN AN ORGANIZED HEALTH CARE EDUCATION/TRAINING PROGRAM

## 2024-03-16 PROCEDURE — 160035 HCHG PACU - 1ST 60 MINS PHASE I: Performed by: STUDENT IN AN ORGANIZED HEALTH CARE EDUCATION/TRAINING PROGRAM

## 2024-03-16 PROCEDURE — 88305 TISSUE EXAM BY PATHOLOGIST: CPT

## 2024-03-16 PROCEDURE — 160009 HCHG ANES TIME/MIN: Performed by: STUDENT IN AN ORGANIZED HEALTH CARE EDUCATION/TRAINING PROGRAM

## 2024-03-16 PROCEDURE — 99284 EMERGENCY DEPT VISIT MOD MDM: CPT | Mod: 57 | Performed by: STUDENT IN AN ORGANIZED HEALTH CARE EDUCATION/TRAINING PROGRAM

## 2024-03-16 PROCEDURE — 84702 CHORIONIC GONADOTROPIN TEST: CPT

## 2024-03-16 PROCEDURE — 700101 HCHG RX REV CODE 250: Performed by: ANESTHESIOLOGY

## 2024-03-16 PROCEDURE — 99291 CRITICAL CARE FIRST HOUR: CPT

## 2024-03-16 PROCEDURE — 36415 COLL VENOUS BLD VENIPUNCTURE: CPT

## 2024-03-16 PROCEDURE — 160036 HCHG PACU - EA ADDL 30 MINS PHASE I: Performed by: STUDENT IN AN ORGANIZED HEALTH CARE EDUCATION/TRAINING PROGRAM

## 2024-03-16 PROCEDURE — 700111 HCHG RX REV CODE 636 W/ 250 OVERRIDE (IP): Performed by: STUDENT IN AN ORGANIZED HEALTH CARE EDUCATION/TRAINING PROGRAM

## 2024-03-16 PROCEDURE — 160002 HCHG RECOVERY MINUTES (STAT): Performed by: STUDENT IN AN ORGANIZED HEALTH CARE EDUCATION/TRAINING PROGRAM

## 2024-03-16 PROCEDURE — 160028 HCHG SURGERY MINUTES - 1ST 30 MINS LEVEL 3: Performed by: STUDENT IN AN ORGANIZED HEALTH CARE EDUCATION/TRAINING PROGRAM

## 2024-03-16 PROCEDURE — 76801 OB US < 14 WKS SINGLE FETUS: CPT

## 2024-03-16 PROCEDURE — 700105 HCHG RX REV CODE 258: Performed by: ANESTHESIOLOGY

## 2024-03-16 PROCEDURE — 58661 LAPAROSCOPY REMOVE ADNEXA: CPT | Performed by: STUDENT IN AN ORGANIZED HEALTH CARE EDUCATION/TRAINING PROGRAM

## 2024-03-16 PROCEDURE — 700102 HCHG RX REV CODE 250 W/ 637 OVERRIDE(OP): Performed by: ANESTHESIOLOGY

## 2024-03-16 RX ORDER — OXYCODONE HYDROCHLORIDE 5 MG/1
5 TABLET ORAL EVERY 8 HOURS PRN
Qty: 10 TABLET | Refills: 0 | Status: SHIPPED | OUTPATIENT
Start: 2024-03-16 | End: 2024-03-21

## 2024-03-16 RX ORDER — HALOPERIDOL 5 MG/ML
1 INJECTION INTRAMUSCULAR
Status: DISCONTINUED | OUTPATIENT
Start: 2024-03-16 | End: 2024-03-19 | Stop reason: HOSPADM

## 2024-03-16 RX ORDER — ONDANSETRON 2 MG/ML
4 INJECTION INTRAMUSCULAR; INTRAVENOUS
Status: COMPLETED | OUTPATIENT
Start: 2024-03-16 | End: 2024-03-16

## 2024-03-16 RX ORDER — GLYCOPYRROLATE 0.2 MG/ML
INJECTION INTRAMUSCULAR; INTRAVENOUS PRN
Status: DISCONTINUED | OUTPATIENT
Start: 2024-03-16 | End: 2024-03-16 | Stop reason: SURG

## 2024-03-16 RX ORDER — KETOROLAC TROMETHAMINE 15 MG/ML
INJECTION, SOLUTION INTRAMUSCULAR; INTRAVENOUS PRN
Status: DISCONTINUED | OUTPATIENT
Start: 2024-03-16 | End: 2024-03-16 | Stop reason: SURG

## 2024-03-16 RX ORDER — IPRATROPIUM BROMIDE AND ALBUTEROL SULFATE 2.5; .5 MG/3ML; MG/3ML
3 SOLUTION RESPIRATORY (INHALATION)
Status: DISCONTINUED | OUTPATIENT
Start: 2024-03-16 | End: 2024-03-19 | Stop reason: HOSPADM

## 2024-03-16 RX ORDER — ROCURONIUM BROMIDE 10 MG/ML
INJECTION, SOLUTION INTRAVENOUS PRN
Status: DISCONTINUED | OUTPATIENT
Start: 2024-03-16 | End: 2024-03-16 | Stop reason: SURG

## 2024-03-16 RX ORDER — DEXAMETHASONE SODIUM PHOSPHATE 4 MG/ML
INJECTION, SOLUTION INTRA-ARTICULAR; INTRALESIONAL; INTRAMUSCULAR; INTRAVENOUS; SOFT TISSUE PRN
Status: DISCONTINUED | OUTPATIENT
Start: 2024-03-16 | End: 2024-03-16 | Stop reason: SURG

## 2024-03-16 RX ORDER — OXYCODONE HYDROCHLORIDE 5 MG/1
5 TABLET ORAL EVERY 4 HOURS PRN
Qty: 6 TABLET | Refills: 0 | Status: SHIPPED | OUTPATIENT
Start: 2024-03-16 | End: 2024-03-18

## 2024-03-16 RX ORDER — LABETALOL HYDROCHLORIDE 5 MG/ML
5 INJECTION, SOLUTION INTRAVENOUS
Status: DISCONTINUED | OUTPATIENT
Start: 2024-03-16 | End: 2024-03-19 | Stop reason: HOSPADM

## 2024-03-16 RX ORDER — SODIUM CHLORIDE, SODIUM LACTATE, POTASSIUM CHLORIDE, CALCIUM CHLORIDE 600; 310; 30; 20 MG/100ML; MG/100ML; MG/100ML; MG/100ML
INJECTION, SOLUTION INTRAVENOUS
Status: DISCONTINUED | OUTPATIENT
Start: 2024-03-16 | End: 2024-03-16 | Stop reason: SURG

## 2024-03-16 RX ORDER — EPHEDRINE SULFATE 50 MG/ML
5 INJECTION, SOLUTION INTRAVENOUS
Status: DISCONTINUED | OUTPATIENT
Start: 2024-03-16 | End: 2024-03-19 | Stop reason: HOSPADM

## 2024-03-16 RX ORDER — MEPERIDINE HYDROCHLORIDE 25 MG/ML
12.5 INJECTION INTRAMUSCULAR; INTRAVENOUS; SUBCUTANEOUS
Status: DISCONTINUED | OUTPATIENT
Start: 2024-03-16 | End: 2024-03-19 | Stop reason: HOSPADM

## 2024-03-16 RX ORDER — LIDOCAINE HYDROCHLORIDE 20 MG/ML
INJECTION, SOLUTION EPIDURAL; INFILTRATION; INTRACAUDAL; PERINEURAL PRN
Status: DISCONTINUED | OUTPATIENT
Start: 2024-03-16 | End: 2024-03-16 | Stop reason: SURG

## 2024-03-16 RX ORDER — SODIUM CHLORIDE, SODIUM LACTATE, POTASSIUM CHLORIDE, CALCIUM CHLORIDE 600; 310; 30; 20 MG/100ML; MG/100ML; MG/100ML; MG/100ML
INJECTION, SOLUTION INTRAVENOUS CONTINUOUS
Status: DISCONTINUED | OUTPATIENT
Start: 2024-03-16 | End: 2024-03-19 | Stop reason: HOSPADM

## 2024-03-16 RX ORDER — HYDROMORPHONE HYDROCHLORIDE 1 MG/ML
0.1 INJECTION, SOLUTION INTRAMUSCULAR; INTRAVENOUS; SUBCUTANEOUS
Status: DISCONTINUED | OUTPATIENT
Start: 2024-03-16 | End: 2024-03-19 | Stop reason: HOSPADM

## 2024-03-16 RX ORDER — HYDROMORPHONE HYDROCHLORIDE 1 MG/ML
0.2 INJECTION, SOLUTION INTRAMUSCULAR; INTRAVENOUS; SUBCUTANEOUS
Status: DISCONTINUED | OUTPATIENT
Start: 2024-03-16 | End: 2024-03-19 | Stop reason: HOSPADM

## 2024-03-16 RX ORDER — MIDAZOLAM HYDROCHLORIDE 1 MG/ML
1 INJECTION INTRAMUSCULAR; INTRAVENOUS
Status: DISCONTINUED | OUTPATIENT
Start: 2024-03-16 | End: 2024-03-19 | Stop reason: HOSPADM

## 2024-03-16 RX ORDER — HYDRALAZINE HYDROCHLORIDE 20 MG/ML
5 INJECTION INTRAMUSCULAR; INTRAVENOUS
Status: DISCONTINUED | OUTPATIENT
Start: 2024-03-16 | End: 2024-03-19 | Stop reason: HOSPADM

## 2024-03-16 RX ORDER — ONDANSETRON 2 MG/ML
INJECTION INTRAMUSCULAR; INTRAVENOUS PRN
Status: DISCONTINUED | OUTPATIENT
Start: 2024-03-16 | End: 2024-03-16 | Stop reason: SURG

## 2024-03-16 RX ORDER — OXYCODONE HYDROCHLORIDE 5 MG/1
5 TABLET ORAL EVERY 6 HOURS PRN
Qty: 3 TABLET | Refills: 0 | Status: SHIPPED | OUTPATIENT
Start: 2024-03-16 | End: 2024-03-17

## 2024-03-16 RX ORDER — NEOSTIGMINE METHYLSULFATE 1 MG/ML
INJECTION, SOLUTION INTRAVENOUS PRN
Status: DISCONTINUED | OUTPATIENT
Start: 2024-03-16 | End: 2024-03-16 | Stop reason: SURG

## 2024-03-16 RX ORDER — BUPIVACAINE HYDROCHLORIDE 2.5 MG/ML
INJECTION, SOLUTION EPIDURAL; INFILTRATION; INTRACAUDAL
Status: DISCONTINUED | OUTPATIENT
Start: 2024-03-16 | End: 2024-03-16 | Stop reason: HOSPADM

## 2024-03-16 RX ORDER — OXYCODONE HYDROCHLORIDE 5 MG/1
TABLET ORAL
Status: DISCONTINUED
Start: 2024-03-16 | End: 2024-03-17 | Stop reason: HOSPADM

## 2024-03-16 RX ORDER — HYDROMORPHONE HYDROCHLORIDE 1 MG/ML
0.4 INJECTION, SOLUTION INTRAMUSCULAR; INTRAVENOUS; SUBCUTANEOUS
Status: DISCONTINUED | OUTPATIENT
Start: 2024-03-16 | End: 2024-03-19 | Stop reason: HOSPADM

## 2024-03-16 RX ORDER — OXYCODONE HCL 5 MG/5 ML
10 SOLUTION, ORAL ORAL
Status: COMPLETED | OUTPATIENT
Start: 2024-03-16 | End: 2024-03-16

## 2024-03-16 RX ORDER — DIPHENHYDRAMINE HYDROCHLORIDE 50 MG/ML
12.5 INJECTION INTRAMUSCULAR; INTRAVENOUS
Status: DISCONTINUED | OUTPATIENT
Start: 2024-03-16 | End: 2024-03-19 | Stop reason: HOSPADM

## 2024-03-16 RX ORDER — CEFAZOLIN SODIUM 1 G/3ML
INJECTION, POWDER, FOR SOLUTION INTRAMUSCULAR; INTRAVENOUS PRN
Status: DISCONTINUED | OUTPATIENT
Start: 2024-03-16 | End: 2024-03-16 | Stop reason: SURG

## 2024-03-16 RX ORDER — OXYCODONE HCL 5 MG/5 ML
5 SOLUTION, ORAL ORAL
Status: COMPLETED | OUTPATIENT
Start: 2024-03-16 | End: 2024-03-16

## 2024-03-16 RX ADMIN — SODIUM CHLORIDE, POTASSIUM CHLORIDE, SODIUM LACTATE AND CALCIUM CHLORIDE: 600; 310; 30; 20 INJECTION, SOLUTION INTRAVENOUS at 18:58

## 2024-03-16 RX ADMIN — PROPOFOL 150 MG: 10 INJECTION, EMULSION INTRAVENOUS at 19:02

## 2024-03-16 RX ADMIN — LIDOCAINE HYDROCHLORIDE 80 MG: 20 INJECTION, SOLUTION EPIDURAL; INFILTRATION; INTRACAUDAL at 19:02

## 2024-03-16 RX ADMIN — SODIUM CHLORIDE, POTASSIUM CHLORIDE, SODIUM LACTATE AND CALCIUM CHLORIDE: 600; 310; 30; 20 INJECTION, SOLUTION INTRAVENOUS at 19:40

## 2024-03-16 RX ADMIN — OXYCODONE HYDROCHLORIDE 5 MG: 5 SOLUTION ORAL at 20:24

## 2024-03-16 RX ADMIN — NEOSTIGMINE METHYLSULFATE 3 MG: 1 INJECTION INTRAVENOUS at 19:43

## 2024-03-16 RX ADMIN — ONDANSETRON 4 MG: 2 INJECTION INTRAMUSCULAR; INTRAVENOUS at 21:15

## 2024-03-16 RX ADMIN — GLYCOPYRROLATE 0.4 MG: 0.2 INJECTION INTRAMUSCULAR; INTRAVENOUS at 19:43

## 2024-03-16 RX ADMIN — DEXAMETHASONE SODIUM PHOSPHATE 8 MG: 4 INJECTION INTRA-ARTICULAR; INTRALESIONAL; INTRAMUSCULAR; INTRAVENOUS; SOFT TISSUE at 19:14

## 2024-03-16 RX ADMIN — CEFAZOLIN 2 G: 1 INJECTION, POWDER, FOR SOLUTION INTRAMUSCULAR; INTRAVENOUS at 19:06

## 2024-03-16 RX ADMIN — KETOROLAC TROMETHAMINE 15 MG: 15 INJECTION, SOLUTION INTRAMUSCULAR; INTRAVENOUS at 19:40

## 2024-03-16 RX ADMIN — FENTANYL CITRATE 100 MCG: 50 INJECTION, SOLUTION INTRAMUSCULAR; INTRAVENOUS at 19:06

## 2024-03-16 RX ADMIN — FENTANYL CITRATE 50 MCG: 50 INJECTION, SOLUTION INTRAMUSCULAR; INTRAVENOUS at 19:40

## 2024-03-16 RX ADMIN — ROCURONIUM BROMIDE 40 MG: 50 INJECTION, SOLUTION INTRAVENOUS at 19:02

## 2024-03-16 RX ADMIN — ONDANSETRON 4 MG: 2 INJECTION INTRAMUSCULAR; INTRAVENOUS at 19:40

## 2024-03-16 ASSESSMENT — PAIN DESCRIPTION - PAIN TYPE
TYPE: SURGICAL PAIN

## 2024-03-16 ASSESSMENT — PAIN SCALES - GENERAL: PAIN_LEVEL: 0

## 2024-03-16 ASSESSMENT — FIBROSIS 4 INDEX: FIB4 SCORE: 0.4

## 2024-03-16 NOTE — ED TRIAGE NOTES
Chief Complaint   Patient presents with    Sent by MD     For recheck hcg and possible tx ectopic      Pt ambulated to triage seen here last week for ectopic pregnancy and received methotrexate . Sent by  for recheck on hcg and possible another dose of tx medication.

## 2024-03-16 NOTE — CONSULTS
GYN Consultation    Tracy Koenig  1995  [id number]    CC/reason for consult: Beta HcG follow up, pregnancy of unknown location  Requesting physician: Charly Odell M.D.     HPI: Tracy Koenig is a 28 y.o.  with pregnancy of unknown location presenting to the ER for weekend beta follow-up on day 7 status post methotrexate.  LMP 2024 with pregnancy of unknown location and Mirena IUD in place.  Was seen in our clinic on 2024 with Dr. Benjamin and presented to ED for repeat HCG quant.  Lab and ultrasound were performed at [facility] and notable for abnormal rise in HCG quant and no intrauterine gestation. She has had bleeding for about 1 month. She overall feels stable today from prior visit.     She was counseled for methotrexate 1 dose protocol by Dr. Rosas on 3/10.     Beta trend:   2435 (3/10) [ MTX ] --> Day 4 2835 (3/13) --> Day 7 3138 (today, 3 /16)    This represents and abnormal rise. Repeat ultrasound was performed, which again did not show any intrauterine pregnancy, but no definitive extrauterine pregnancy was demonstrated.  The suspicion of the radiologist was a left adnexal ectopic pregnancy.  My review of the images, both on 3/10/2024, as well as today also we do a high suspicion of a persistent ectopic, possibly early rupture    She is healthy, no medical issues, no prior abdominal surgeries.    OB History    Para Term  AB Living   2 2 2 0 0 2   SAB IAB Ectopic Molar Multiple Live Births   0 0 0   0 2      # Outcome Date GA Lbr Antonio/2nd Weight Sex Delivery Anes PTL Lv   2 Term 22 38w6d 10:28 / 00:29 7 lb 2.6 oz F Vag-Spont EPI N SHAWN   1 Term 18 40w5d  7 lb 3 oz  Vag-Spont   SHAWN         Past Medical History:   Diagnosis Date    Environmental allergies     ORAL CONTRACEPTION EVAL PRIO TO METHOE W/WO INITIATION     Oral contraceptive use        No past surgical history on file.    Medications:   No current Epic-ordered facility-administered  medications on file.     Current Outpatient Medications Ordered in Epic   Medication Sig Dispense Refill    levonorgestrel (MIRENA, 52 MG,) 20 MCG/DAY IUD 1 Each by Intrauterine route one time. Last placed on 2/2023      acetaminophen (TYLENOL) 500 MG Tab Take 500 mg by mouth 2 times a day as needed. Indications: Pain      ibuprofen (MOTRIN) 200 MG Tab Take 200 mg by mouth every 6 hours as needed. Indications: Pain      Misc. Devices (BREAST PUMP) Misc Please dispense 1 breast pump for desired lactation.  Duration 1 year. 1 Each 0    Prenatal Vit-Fe Fumarate-FA (PRENATAL PO) Take 1 Tablet by mouth every evening.         Allergies: Patient has no known allergies.    Social History     Socioeconomic History    Marital status:    Tobacco Use    Smoking status: Never    Smokeless tobacco: Never    Tobacco comments:     avoid all tobacco products   Vaping Use    Vaping Use: Never used   Substance and Sexual Activity    Alcohol use: Yes     Comment: occ    Drug use: No    Sexual activity: Yes     Partners: Male     Birth control/protection: None       Family History   Problem Relation Age of Onset    Arthritis Mother     Heart Disease Maternal Grandfather     Diabetes Neg Hx     Hypertension Neg Hx     Hyperlipidemia Neg Hx     Stroke Neg Hx     Cancer Neg Hx      Physical Exam:  /79   Pulse 88   Temp 36.6 °C (97.8 °F) (Temporal)   Resp 18   Ht 5'   Wt 125 lb 10.6 oz   LMP 01/20/2024 (Approximate)   SpO2 100%   BMI 24.54 kg/m²   gen: AAO, NAD, affect appropriate  CV: RRR; no LE edema  resp: no distress    Labs:   2435 (3/10) [ MTX ] --> Day 4 2835 (3/13) --> Day 7 3138 (today, 3 /16)    Cbc, T&S pending    Imaging:     Pelvic US 3/16/24 images reviewed, compared to 3/10/24  UTERUS:  Uterus is retroverted.  No gestational sac demonstrated.  OVARIES:  The right ovary measures 5.12 cm x 2.45 cm x 3.03 cm. Doppler examination of the right ovary shows normal waveforms. Complex cystic structure measuring  2.6 x 2.2 x 2 cm with internal debris.  The left ovary measures 4.60 cm x 3.80 cm x 4.66 cm. Doppler examination of the left ovary shows normal waveforms. Complex cystic structure with internal debris measuring 2.5 x 1.7 x 1.6 cm.  Free fluid in the cul-de-sac and LEFT adnexal region with low-level echoes. .  IMPRESSION:  1.  No intrauterine or extrauterine pregnancy demonstrated.  2.  Suspect LEFT adnexal ectopic however no extrauterine fetal pole is demonstrated.  3.  Complex free fluid in the cul-de-sac and LEFT adnexal region suggests rupture.  4.  Complex RIGHT ovarian cyst, likely hemorrhagic.      A/P: 28 y.o.  with pregnancy of unknown location, presumed ectopic, mirena IUD in place, abnormal bHcG response to methotrexate between day 4->7, repeat US did not reveal an intrauterine pregnancy, but there was no free fluid and poor distention of the adnexal regions consistent with tubal ectopic, possible rupture.  I discussed this finding with the patient and her , that while methotrexate can sometimes cause these changes in the pelvis, the fact that she has an uptrending beta shows that the ectopic pregnancy is less likely to resolve with methotrexate alone, and there may be signs of rupture which would require surgical evaluation.  I personally recommend surgical evaluation via diagnostic laparoscopy and removal of ectopic pregnancy. She was counseled that the most likely case is that the ectopic is within the tube but couls also be in the ovary or the uterus or in other less common locations.  She is counseled on possible blood transfusion and safety.  She is counseled the risk and benefits of surgery including bleeding, infection, damage to surrounding organs.  She understands that future fertility after salpingectomy is usually comparable to someone who has not had salpingectomy if there is a healthy remaining tube.  - Consented for: Diagnostic laparoscopy, removal of ectopic pregnancy,  possible right or left salpingectomy, possible laparotomy, and all indicated procedures.          Ger Flores MD, FACOG  Urogynecology and Reconstructive Pelvic Surgery   Department of Obstetrics and Gynecology  [facility]

## 2024-03-16 NOTE — ED PROVIDER NOTES
ER Provider Note    Scribed for Charly Odell M.d. by Sage Gamez. 3/16/2024  1:00 PM    Primary Care Provider: Octavia Shah M.D.    CHIEF COMPLAINT  Chief Complaint   Patient presents with    Sent by MD     For recheck hcg and possible tx ectopic      EXTERNAL RECORDS REVIEWED  Outpatient Notes The patient's records show that she was seen 3/10/2024 at the [facility] ED for evaluation of a potential ectopic pregnancy. The patient was told to return for further evaluation prompting visitation today.    HPI/ROS  LIMITATION TO HISTORY   Select: : None    OUTSIDE HISTORIAN(S):  Significant other The patient's  was present at bedside    Tracy Koenig is a 28 y.o. female who presents to the ED for a reevaluation of an ectopic pregnancy onset one week ago..The patient reports she was seen at the [facility] ED last week for a potential ectopic pregnancy evaluation. The patient received a methotrexate injection at this time and was told to return to the [facility] ED today for a recheck on HCG and additional treatments. The patient explains her symptoms have been alleviated since her last treatment but notes she has had a change in menstrual bleeding since and some associated mild abdominal pain. The patient is followed by Dr. Rosas (OB-GYN).     PAST MEDICAL HISTORY  Past Medical History:   Diagnosis Date    Environmental allergies     ORAL CONTRACEPTION EVAL PRIO TO METHOE W/WO INITIATION     Oral contraceptive use      SURGICAL HISTORY  No past surgical history NOTED.    FAMILY HISTORY  Family History   Problem Relation Age of Onset    Arthritis Mother     Heart Disease Maternal Grandfather     Diabetes Neg Hx     Hypertension Neg Hx     Hyperlipidemia Neg Hx     Stroke Neg Hx     Cancer Neg Hx      SOCIAL HISTORY   reports that she has never smoked. She has never used smokeless tobacco. She reports current alcohol use. She reports that she does not use drugs.    CURRENT MEDICATIONS  Current Discharge  Medication List        ALLERGIES  Patient has no known allergies.    PHYSICAL EXAM  VITAL SIGNS: /79   Pulse 88   Temp 36.6 °C (97.8 °F) (Temporal)   Resp 18   Ht 1.524 m (5')   Wt 57 kg (125 lb 10.6 oz)   LMP 01/20/2024 (Approximate)   SpO2 100%   BMI 24.54 kg/m²     Pulse ox interpretation: I interpret this pulse ox as normal.  Constitutional: Alert in no apparent distress.  HENT: No signs of trauma, Bilateral external ears normal, Nose normal.   Eyes: Pupils are equal and reactive, Conjunctiva normal, Non-icteric.   Neck: Normal range of motion, Supple, No stridor.    Cardiovascular: Normal peripheral perfusion  Thorax & Lungs: Unlabored respirations, equal chest expansion, no accessory muscle use  Abdomen: Non-distended  Skin:  No erythema, No rash.   Back: Normal alignment and ROM  Extremities: No gross deformity  Musculoskeletal: Good range of motion in all major joints.   Neurologic: Alert, Normal motor function, No focal deficits noted.   Psychiatric: Affect normal, Judgment normal, Mood normal.    DIAGNOSTIC STUDIES    Labs:   Labs Reviewed   HCG QUANTITATIVE - Abnormal; Notable for the following components:       Result Value    cg 3138.0 (*)     All other components within normal limits   Labs reviewed by me    COURSE & MEDICAL DECISION MAKING     ED Observation Status? No; Patient does not meet criteria for ED Observation.     INITIAL ASSESSMENT, COURSE AND PLAN  Care Narrative:     1:00 PM - Patient presents to the ED for a follow up .Patient evaluated at bedside and discussed plan of care, including another HCG lab test. Patient verbalizes understanding and agreement to this plan of care. Ordered for HCG Quantitative for further evaluation.     2:14 PM - Quantitative hCG is trending upward, despite recent methotrexate treatment.  I paged OB/GYN.  I suspect they may want to retreat the patient.    2:38 PM - Ordered for US-OB 1st Trimester with Transvaginal for evaluation.    2:40 PM -  Spoke with Dr. Alves (OB-GYN) who agrees to evaluate the patient. He will review the ultrasound and consider a repeat methotrexate treatment.     4:30 PM Dr. Alves will take the patient to the operating room for what appears to be a ruptured ectopic pregnancy.  The patient has consented to this procedure.  She will be admitted to OB/GYN service.        DISPOSITION AND DISCUSSIONS  I have discussed management of the patient with the following physicians and RAJ's: Dr. Alves (OB-GYN)    DISPOSITION:  Patient will be admitted to OB/GYN in guarded condition.      FINAL DIAGNOSIS  1. Ruptured ectopic pregnancy    2. Acute post-operative pain            Sage GARNER (Scribe), am scribing for, and in the presence of, Charly Odell M.D..    Electronically signed by: Sage Gamez (Scribe), 3/16/2024    ICharly M.D. personally performed the services described in this documentation, as scribed by Sage Gamez in my presence, and it is both accurate and complete.

## 2024-03-17 ENCOUNTER — PHARMACY VISIT (OUTPATIENT)
Dept: PHARMACY | Facility: MEDICAL CENTER | Age: 29
End: 2024-03-17
Payer: COMMERCIAL

## 2024-03-17 PROCEDURE — RXMED WILLOW AMBULATORY MEDICATION CHARGE: Performed by: STUDENT IN AN ORGANIZED HEALTH CARE EDUCATION/TRAINING PROGRAM

## 2024-03-17 NOTE — ED NOTES
Med rec updated and complete. Allergies reviewed.   Confirmed name and date of birth.   Pt denies taking medications.    Home pharmacy  [facility] = [phone]

## 2024-03-17 NOTE — ANESTHESIA PROCEDURE NOTES
Airway    Date/Time: 3/16/2024 7:04 PM    Performed by: Domingo Sanchez M.D.  Authorized by: Domingo Sanchez M.D.    Location:  OR  Urgency:  Elective  Difficult Airway: No    Indications for Airway Management:  Anesthesia      Spontaneous Ventilation: absent    Sedation Level:  Deep  Preoxygenated: Yes    Patient Position:  Sniffing  Mask Difficulty Assessment:  1 - vent by mask  Final Airway Type:  Endotracheal airway  Final Endotracheal Airway:  ETT  Cuffed: Yes    Technique Used for Successful ETT Placement:  Direct laryngoscopy  Devices/Methods Used in Placement:  Cricoid pressure and intubating stylet    Insertion Site:  Oral  Blade Type:  Maguire  Laryngoscope Blade/Videolaryngoscope Blade Size:  2  ETT Size (mm):  7.0  Measured from:  Teeth  ETT to Teeth (cm):  21  Placement Verified by: auscultation and capnometry    Cormack-Lehane Classification:  Grade IIa - partial view of glottis  Number of Attempts at Approach:  1

## 2024-03-17 NOTE — DISCHARGE INSTRUCTIONS
Post-op: What to expect after your surgery    For urgent post-operative questions or concerns, please call Dr. Flores's direct on-call cell line at [phone].     For less-urgent matters (Monday - Friday), you may send a message through [employer] or call the general Women's Health line at [phone].     Bladder function  Try to empty your bladder (urinate) at regular intervals by sitting on the toilet and relaxing.  You may need to adjust your positioning (lean forward or back) to empty the bladder fully. It is important that you do not push or strain to empty your bladder.     Call the surgeon at the number above if you cannot urinate. Also, call for treatment if you have signs and symptoms of a urinary tract infection, including:   Burning with urination  Bladder pain  Worsening need to urinate right away  Urine with a bad smell      Vaginal care:   Do not go swimming, take sitting baths, or have sexual intercourse for at least 2 weeks after surgery. Do not place anything in the vagina during this time    Vaginal discharge and bleeding/spotting is also normal through the entire recovery.  This is normal. Contact the office with any heavy bleeding soaking through pads, bad-smelling discharge, or worsening pain.     Pain management:  Surgical pain is controlled in most patients with only non-steroidal anti-inflammatory drugs (NSAIDs, such as ibuprofen, “Advil”), and acetaminophen (Tylenol). These drugs can be taken together without interaction. In the hospital, your nurse will give you these medications at regular intervals to both treat and to prevent pain. If these are not controlling your pain, you may ask the nurse for additional medication. When you go home, you will also take NSAIDs and acetaminophen for pain management. Sometimes, a short course of narcotics such as oxycodone and hydromorphone is are required. We do not recommend using narcotics regularly as it can lead to constipation or dizziness, and  falls.     Take ibuprofen 800mg three times per day, along with tylenol 1000mg three times per day, for the first 5-7 days after surgery to prevent and treat pain and inflammation. If this is insufficient, take oxycodone 5mg every 8 hours as needed.   After 5-7 days, you may take 400mg iburpfen and 500mg tylenol as-needed    Do not drive or operate heavy machinery while using narcotics. You are unlikely to become addicted if you need to take a narcotic medication a few times within the first week of your surgery.  After the first few days to one week, your pain should decrease and you should not have pain severe enough to need narcotics. If you continue having severe pain, contact your surgeon for re-evaluation.     Abdominal wound care  The incisions on your abdomen will be closed with either small bandages or a surgical glue. There are also tiny dissolving sutures beneath the skin. You can shower with these in place. In the shower, let the soapy water run over your incisions. Do not scrub or wipe your incisions. Keep the incisions dry for the remainder of the day/night. The bandages will fall off on their own, or you can remove them after at least 3 days if they become discolored or dirty. The glue will also fall off on its own after a few weeks. Small sutures that pop out through the skin are normal and will dissolve over time.    Call us if you feel increasing pain, redness, discharge or warmth at the incision.     Bowel function  Constipation is common after surgery. This means it may take several days before having a normal bowel movement. It is important to take extra steps to keep your stools soft to avoid straining with bowel movements. Straining may damage the prolapse repair before it has healed. Most patients will be given a prescription for a stool softener (docusate) as well as a gentle laxative (Miralax or lactulose). These mediations adds water to the stool to make it easier to pass. Take them daily  throughout your recovery. Hold for a day if you develop diarrhea.     Call us if you experience any repeated episodes of vomiting, worsening abdominal pain/bloating, or are unable to have a bowel movement for more than 3 days.     Activity restrictions  During the first 6 weeks avoid any type of heavy lifting that requires you to strain.  Gentle walking is good exercise. Start with about 10 minutes a day when you feel ready and build up gradually. Avoid repetitive squatting or bending at the waist.Avoid any fitness-type training, aerobics, etc. for at least 6 weeks after surgery. Generally, you will need 4-6 weeks off work. This period may be longer if you have a very physical job.

## 2024-03-17 NOTE — ED NOTES
Break RN:     Report given to pre-op RN. PIV placed and ABO drawn per pre-op RN request. Transport here to get pt, report given to transport. Pt ambulated to bathroom.

## 2024-03-17 NOTE — ANESTHESIA TIME REPORT
Anesthesia Start and Stop Event Times       Date Time Event    3/16/2024 1846 Ready for Procedure     1858 Anesthesia Start     2005 Anesthesia Stop          Responsible Staff  03/16/24      Name Role Begin End    Domingo Sanchez M.D. Anesth 1858 2005          Overtime Reason:  no overtime (within assigned shift)    Comments:

## 2024-03-17 NOTE — ANESTHESIA POSTPROCEDURE EVALUATION
Patient: Tracy Koenig    Procedure Summary       Date: 03/16/24 Room / Location: [facility] [place] 08 / SURGERY [facility]    Anesthesia Start: 1858 Anesthesia Stop: 2005    Procedures:       LAPAROSCOPY (Abdomen)      SALPINGECTOMY (Left: Abdomen) Diagnosis: (Ectopic Pregnancy)    Surgeons: Ger Flores M.D. Responsible Provider: Domingo Sanchez M.D.    Anesthesia Type: general ASA Status: 2 - Emergent            Final Anesthesia Type: general  Last vitals  BP   Blood Pressure: (!) 141/65    Temp   36.5 °C (97.7 °F)    Pulse   (!) 105   Resp   16    SpO2   98 %      Anesthesia Post Evaluation    Patient location during evaluation: PACU  Patient participation: complete - patient participated  Level of consciousness: awake and alert  Pain score: 0    Airway patency: patent  Anesthetic complications: no  Cardiovascular status: hemodynamically stable  Respiratory status: acceptable  Hydration status: euvolemic    PONV: none          No notable events documented.     Nurse Pain Score: 3 (NPRS)

## 2024-03-17 NOTE — ANESTHESIA PREPROCEDURE EVALUATION
Case: [id number] Date/Time: 03/16/24 1815    Procedure: LAPAROSCOPY RUPTURED ECTOPIC    Location: [facility] 08 / SURGERY [facility]    Surgeons: Ger Flores M.D.            Relevant Problems   Other   (positive) Cystic fibrosis carrier   (positive) Ectopic pregnancy       Physical Exam    Airway   Mallampati: II  TM distance: >3 FB  Neck ROM: full       Cardiovascular - normal exam  Rhythm: regular  Rate: normal  (-) murmur     Dental - normal exam           Pulmonary - normal exam  Breath sounds clear to auscultation     Abdominal    Neurological - normal exam                   Anesthesia Plan    ASA 2- EMERGENT   ASA physical status emergent criteria: compromised vital organ, limb or tissue    Plan - general       Airway plan will be ETT          Induction: intravenous    Postoperative Plan: Postoperative administration of opioids is intended.    Pertinent diagnostic labs and testing reviewed    Informed Consent:    Anesthetic plan and risks discussed with patient.    Use of blood products discussed with: patient whom consented to blood products.

## 2024-03-17 NOTE — OP REPORT
OPERATIVE REPORT     Name: Tracy Koenig    : 1995    MRN: [id number]       PRE-OP DIAGNOSIS:   Suspected ruptured ectopic pregnancy, s/p methotrexate therapy            POST-OP DIAGNOSIS:   Left ruptured ectopic pregnancy, hemoperitoneum              PROCEDURE:    -    Diagnostic laparoscopy    -     Evacuation of hemoperitoneum   -     Left salpingectomy with presumed ectopic             SURGEON:   Surgeon(s) and Role:     * Ger Flores M.D. - Primary            ANESTHESIA: General            FINDINGS:      Laparoscopy: No entry injuries to viscera or vasculature. Normal appearing peritoneal cavity and liver edge without significant adhesions.  Approximately 250 mL of hemoperitoneum encountered.  Normal-appearing bilateral ovaries, normal-appearing uterus, normal-appearing right fallopian tube.  Left fallopian tube grossly dilated with active bleeding noted from the fimbria, consistent with ectopic pregnancy.  Excellent hemostasis at completion of the procedure.  Normal survey prior to closure         ESTIMATED BLOOD LOSS: Pre-op 250mL, intra-op minimal            DRAINS: none                   SPECIMENS:   ID Type Source Tests Collected by Time Destination   A : LEFT TUBE AND ECTOPIC Other Other PATHOLOGY SPECIMEN Ger Flores M.D. 3/16/2024  7:29 PM                IMPLANTS: none            COMPLICATIONS: none            DISPOSITION: discharge            CONDITION: stable            INDICATION FOR SURGERY:     28 y.o.  with pregnancy of unknown location, presumed ectopic, mirena IUD in place, abnormal bHcG response to methotrexate between day 4->7, repeat US did not reveal an intrauterine pregnancy, but there was no free fluid and poor distention of the adnexal regions consistent with tubal ectopic, possible rupture.  I discussed this finding with the patient and her , that while methotrexate can sometimes cause these changes in the pelvis, the fact that she has an uptrending beta  shows that the ectopic pregnancy is less likely to resolve with methotrexate alone, and there may be signs of rupture which would require surgical evaluation.  I personally recommend surgical evaluation via diagnostic laparoscopy and removal of ectopic pregnancy. She was counseled that the most likely case is that the ectopic is within the tube but couls also be in the ovary or the uterus or in other less common locations.  She is counseled on possible blood transfusion and safety.  She is counseled the risk and benefits of surgery including bleeding, infection, damage to surrounding organs.  She understands that future fertility after salpingectomy is usually comparable to someone who has not had salpingectomy if there is a healthy remaining tube. She was consented for: Diagnostic laparoscopy, removal of ectopic pregnancy, possible right or left salpingectomy, possible laparotomy, and all indicated procedures.   All questions were answered and consent was signed and witnessed.        TECHNIQUE:    I spoke with the patient in the preoperative holding area, where again risks, benefits, indications, and alternatives were reviewed and informed consent was obtained.  She was then transferred to the operative suite, where she was identified, procedure was confirmed.  She was placed in dorsal supine position, given general endotracheal anesthesia without difficulty.  She was then placed in a dorsal lithotomy position  in yellowfin stirrups with all pressure points padded.  She was prepared and draped in usual sterile fashion with arms tucked and all pressure points padded.  An appropriate time-out was performed, where patient was identified, procedure was confirmed, and the operative team was introduced.  It was also confirmed that patient did receive cefazolin 2 g IV for prophylaxis, and she also received DVT prophylaxis with sequential compression devices bilaterally throughout the case.  At this time, exam under  anesthesia revealed findings noted above.   A 16-Japanese Paula catheter was then placed in the patient's  bladder for drainage throughout the procedure.  An EEA sizer was advanced into the patient's vagina for manipulation.     Attention was then turned abdominally.  At Zamora's point, approximately 2 thinker breaths below the costal margin a 5 mm incision was made after infiltration of Marcaine.  A Veress needle was placed with 3 audible clicks heard with normal hanging drop confirmation of intraperitoneal placement and low opening pressures.  The peritoneum was then insufflated to a pressure of 15 mmHg.  A 10 mm infraumbilical incision was then made after infiltration of Marcaine, and a 10 mm laparoscopic trocar was inserted under direct visualization of the laparoscope.  Attention was then turned back up to Zamora's point and found that the Veress needle had not damaged any surrounding structures.  This was removed and replaced with a 5 mm laparoscopic trocar.  Finally, a third 5 mm trocar was placed in the left lower quadrant under direct visualization of the laparoscope.  There is obvious hemoperitoneum noted within the pelvis which was evacuated.  The patient was then placed into Trendelenburg position in order to visualize the uterus and bilateral adnexa.  The patient's right adnexa appeared normal and uterus appeared normal but the left had a grossly dilated left fallopian tube with firm tissue within, and active bleeding.  This was then grasped and elevated away from surrounding structures including the AV to pelvic ligament and ureter.  The fallopian tube was then transected using the LigaSure along the mesosalpinx with care to preserve blood supply to the ovaries.  This is then placed into a 10 Endo Catch bag and brought through the umbilical incision and sent for pathology.  A final survey was performed with irrigation and maximal amount of blood removed.  Hemostasis again noted from all surgical sites.   The 10 mm trocar was removed from the umbilical incision and this fascia was closed with 2 interrupted 0 Vicryl sutures using a suture passing device under direct laparoscopic visualization.  Remaining trocars removed under direct visualization after expulsion of pneumoperitoneum, and all skin sites closed with 4 Monocryl followed by Dermabond.     All counts were correct.  The patient was then wakened from general anesthesia easily, and brought to the PACU in stable condition. Anticipate discharge home later tonight.         Ger Flores MD, FACOG  Urogynecology and Reconstructive Pelvic Surgery  Department of Obstetrics and Gynecology  [facility]  [facility]

## 2024-03-18 LAB — PATHOLOGY CONSULT NOTE: NORMAL

## 2024-04-01 NOTE — PROGRESS NOTES
Gynecology Consultation    Tracy Koenig  1995  [id number]      HPI: Tracy Koenig is a 28 y.o. 3 weeks s/p lpsc salpingectomy for ruptured ectopic which failed methotrexate therapy.     She is overall recovering well, minimal pain, incisions healing well.  She has questions about return to periods, multivitamins now that she is not trying to conceive, and return to normal activities.    OB History    Para Term  AB Living   2 2 2 0 0 2   SAB IAB Ectopic Molar Multiple Live Births   0 0 0   0 2      # Outcome Date GA Lbr Antonio/2nd Weight Sex Delivery Anes PTL Lv   2 Term 22 38w6d 10:28 / 00:29 7 lb 2.6 oz F Vag-Spont EPI N SHAWN   1 Term 16 40w5d  7 lb 3 oz  Vag-Spont   SHAWN         Past Medical History:   Diagnosis Date    Environmental allergies     ORAL CONTRACEPTION EVAL PRIO TO METHOE W/WO INITIATION     Oral contraceptive use        Past Surgical History:   Procedure Laterality Date    NM LAP,DIAGNOSTIC ABDOMEN  3/16/2024    Procedure: LAPAROSCOPY;  Surgeon: Ger Flores M.D.;  Location: SURGERY [facility];  Service: Gynecology    SALPINGECTOMY [facility] 3/16/2024    Procedure: SALPINGECTOMY;  Surgeon: Ger Flores M.D.;  Location: SURGERY [facility];  Service: Gynecology       Medications:   No current [facility]-ordered outpatient medications on file.     No current [facility]-ordered facility-administered medications on file.       Allergies: Patient has no known allergies.    Social History     Socioeconomic History    Marital status:    Tobacco Use    Smoking status: Never    Smokeless tobacco: Never    Tobacco comments:     avoid all tobacco products   Vaping Use    Vaping Use: Never used   Substance and Sexual Activity    Alcohol use: Yes     Comment: occ    Drug use: No    Sexual activity: Yes     Partners: Male     Birth control/protection: None       Family History   Problem Relation Age of Onset    Arthritis Mother     Heart Disease Maternal Grandfather     Diabetes  Neg Hx     Hypertension Neg Hx     Hyperlipidemia Neg Hx     Stroke Neg Hx     Cancer Neg Hx      Physical Exam:  LMP 2024 (Approximate)   gen: AAO, NAD, affect appropriate  CV: RRR; no LE edema  resp: no distress  Trixie: Incisions well-healed, clean/dry, intact, minimal tenderness    Labs:   Allergy: Some chorionic filling noted within the tube, significant hemorrhage    Imaging:     Pelvic US 3/16/24 images reviewed, compared to 3/10/24  UTERUS:  Uterus is retroverted.  No gestational sac demonstrated.  OVARIES:  The right ovary measures 5.12 cm x 2.45 cm x 3.03 cm. Doppler examination of the right ovary shows normal waveforms. Complex cystic structure measuring 2.6 x 2.2 x 2 cm with internal debris.  The left ovary measures 4.60 cm x 3.80 cm x 4.66 cm. Doppler examination of the left ovary shows normal waveforms. Complex cystic structure with internal debris measuring 2.5 x 1.7 x 1.6 cm.  Free fluid in the cul-de-sac and LEFT adnexal region with low-level echoes. .  IMPRESSION:  1.  No intrauterine or extrauterine pregnancy demonstrated.  2.  Suspect LEFT adnexal ectopic however no extrauterine fetal pole is demonstrated.  3.  Complex free fluid in the cul-de-sac and LEFT adnexal region suggests rupture.  4.  Complex RIGHT ovarian cyst, likely hemorrhagic.      A/P: 28 y.o.  who is 3 weeks s/p diagnostic laparoscopy and salpingectomy for ruptured ectopic pregnancy after failed methotrexate therapy.  She is healing well, meeting postoperative milestones, no complications.  -Return to most normal activities, abstain from heavy lifting until 6 weeks postop  -Return to work paperwork discussed, she will return 3/15  -Does not desire future fertility,  planning on getting a vasectomy  -IUD still in place, should be effective for another 6 years  -Counseled on returning to normal annual visits next year        Ger Flores MD, FACOG  Urogynecology and Reconstructive Pelvic Surgery   Department of  Obstetrics and Gynecology  [facility]  [facility] access hospital

## 2024-04-02 ENCOUNTER — APPOINTMENT (OUTPATIENT)
Dept: GYNECOLOGY | Facility: CLINIC | Age: 29
End: 2024-04-02
Payer: COMMERCIAL

## 2024-04-02 VITALS — DIASTOLIC BLOOD PRESSURE: 72 MMHG | SYSTOLIC BLOOD PRESSURE: 139 MMHG | HEART RATE: 68 BPM

## 2024-04-02 DIAGNOSIS — Z98.890 POSTOPERATIVE STATE: ICD-10-CM

## 2024-04-02 PROBLEM — O00.90 ECTOPIC PREGNANCY: Status: RESOLVED | Noted: 2024-03-16 | Resolved: 2024-04-02

## 2024-04-02 PROCEDURE — 99024 POSTOP FOLLOW-UP VISIT: CPT | Performed by: STUDENT IN AN ORGANIZED HEALTH CARE EDUCATION/TRAINING PROGRAM

## 2024-06-06 ENCOUNTER — RESEARCH ENCOUNTER (OUTPATIENT)
Dept: RESEARCH | Facility: MEDICAL CENTER | Age: 29
End: 2024-06-06
Payer: COMMERCIAL

## 2024-07-01 ENCOUNTER — APPOINTMENT (OUTPATIENT)
Dept: OBGYN | Facility: CLINIC | Age: 29
End: 2024-07-01
Payer: COMMERCIAL

## 2024-07-01 VITALS — BODY MASS INDEX: 25.39 KG/M2 | SYSTOLIC BLOOD PRESSURE: 122 MMHG | WEIGHT: 130 LBS | DIASTOLIC BLOOD PRESSURE: 68 MMHG

## 2024-07-01 DIAGNOSIS — Z30.09 BIRTH CONTROL COUNSELING: Primary | ICD-10-CM

## 2024-07-01 DIAGNOSIS — Z30.432 ENCOUNTER FOR IUD REMOVAL: ICD-10-CM

## 2024-07-01 PROCEDURE — 3074F SYST BP LT 130 MM HG: CPT | Performed by: STUDENT IN AN ORGANIZED HEALTH CARE EDUCATION/TRAINING PROGRAM

## 2024-07-01 PROCEDURE — 58301 REMOVE INTRAUTERINE DEVICE: CPT | Performed by: STUDENT IN AN ORGANIZED HEALTH CARE EDUCATION/TRAINING PROGRAM

## 2024-07-01 PROCEDURE — 3078F DIAST BP <80 MM HG: CPT | Performed by: STUDENT IN AN ORGANIZED HEALTH CARE EDUCATION/TRAINING PROGRAM

## 2024-07-01 RX ORDER — NORGESTIMATE AND ETHINYL ESTRADIOL 7DAYSX3 LO
1 KIT ORAL DAILY
Qty: 84 TABLET | Refills: 3 | Status: SHIPPED | OUTPATIENT
Start: 2024-07-01

## 2024-07-01 ASSESSMENT — FIBROSIS 4 INDEX: FIB4 SCORE: 0.4

## 2025-02-26 ENCOUNTER — OFFICE VISIT (OUTPATIENT)
Dept: INTERNAL MEDICINE | Facility: OTHER | Age: 30
End: 2025-02-26
Payer: COMMERCIAL

## 2025-02-26 VITALS
OXYGEN SATURATION: 99 % | SYSTOLIC BLOOD PRESSURE: 127 MMHG | BODY MASS INDEX: 25.8 KG/M2 | HEART RATE: 89 BPM | DIASTOLIC BLOOD PRESSURE: 84 MMHG | HEIGHT: 60 IN | WEIGHT: 131.4 LBS | TEMPERATURE: 97.8 F

## 2025-02-26 DIAGNOSIS — Z23 NEED FOR VACCINATION: ICD-10-CM

## 2025-02-26 DIAGNOSIS — M25.50 ARTHRALGIA, UNSPECIFIED JOINT: ICD-10-CM

## 2025-02-26 DIAGNOSIS — R45.86 MOOD DISTURBANCE: ICD-10-CM

## 2025-02-26 ASSESSMENT — ENCOUNTER SYMPTOMS
CHILLS: 0
HEADACHES: 1
PALPITATIONS: 1
DOUBLE VISION: 0
ABDOMINAL PAIN: 0
NAUSEA: 0
DIZZINESS: 1
CONSTIPATION: 0
COUGH: 0
SORE THROAT: 0
SHORTNESS OF BREATH: 0
NERVOUS/ANXIOUS: 0
BLURRED VISION: 0
DEPRESSION: 0
FEVER: 0

## 2025-02-26 ASSESSMENT — ANXIETY QUESTIONNAIRES
4. TROUBLE RELAXING: NOT AT ALL
1. FEELING NERVOUS, ANXIOUS, OR ON EDGE: NEARLY EVERY DAY
2. NOT BEING ABLE TO STOP OR CONTROL WORRYING: NOT AT ALL
5. BEING SO RESTLESS THAT IT IS HARD TO SIT STILL: NOT AT ALL
GAD7 TOTAL SCORE: 7
3. WORRYING TOO MUCH ABOUT DIFFERENT THINGS: NOT AT ALL
IF YOU CHECKED OFF ANY PROBLEMS ON THIS QUESTIONNAIRE, HOW DIFFICULT HAVE THESE PROBLEMS MADE IT FOR YOU TO DO YOUR WORK, TAKE CARE OF THINGS AT HOME, OR GET ALONG WITH OTHER PEOPLE: NOT DIFFICULT AT ALL
7. FEELING AFRAID AS IF SOMETHING AWFUL MIGHT HAPPEN: SEVERAL DAYS
6. BECOMING EASILY ANNOYED OR IRRITABLE: NEARLY EVERY DAY

## 2025-02-26 ASSESSMENT — FIBROSIS 4 INDEX: FIB4 SCORE: 0.42

## 2025-02-26 ASSESSMENT — PATIENT HEALTH QUESTIONNAIRE - PHQ9
CLINICAL INTERPRETATION OF PHQ2 SCORE: 1
SUM OF ALL RESPONSES TO PHQ QUESTIONS 1-9: 6
5. POOR APPETITE OR OVEREATING: 0 - NOT AT ALL

## 2025-02-26 NOTE — PROGRESS NOTES
New Patient    CC: Establish care    HPI:  Tracy Koenig is a 29 y.o. female who presents today to establish care and discuss acute symptoms.  She has a history of ectopic pregnancy s/p salpingectomy and ovarian cysts.    Reports that she has been having diffuse joint pain for a few years.  Notices it especially in the morning but it improves with movement.  Denies any erythema, joint swelling, rash.  States that she has occasional weak  strength.  Positive history of arthralgia in mother when younger.  Denies any fevers or chills or other sick symptoms.    Also notes that her spouse is concerned that she is more irritable.  This was noticed about 2 to 3 years ago.  She has 2 children and had a recent ectopic pregnancy.  But there is concern that there is some hormonal imbalance.  Denies any depression or anxiety.      Review of Systems   Constitutional:  Positive for malaise/fatigue. Negative for chills and fever.   HENT:  Negative for congestion and sore throat.    Eyes:  Negative for blurred vision and double vision.   Respiratory:  Negative for cough and shortness of breath.    Cardiovascular:  Positive for palpitations. Negative for chest pain.   Gastrointestinal:  Negative for abdominal pain, constipation and nausea.   Musculoskeletal:  Positive for joint pain (Diffuse).   Skin:  Negative for rash.   Neurological:  Positive for dizziness (motion sickness) and headaches (with the contraceptives).   Endo/Heme/Allergies: Negative.    Psychiatric/Behavioral:  Negative for depression. The patient is not nervous/anxious.        Past Medical History:   Diagnosis Date    Ectopic pregnancy     Environmental allergies     ORAL CONTRACEPTION EVAL PRIO TO METHOE W/WO INITIATION     Oral contraceptive use      Past Surgical History:   Procedure Laterality Date    [facility] LAP,DIAGNOSTIC ABDOMEN  3/16/2024    Procedure: LAPAROSCOPY;  Surgeon: Ger Flores M.D.;  Location: SURGERY [facility];  Service: Gynecology  "   SALPINGECTOMY Left 3/16/2024    Procedure: SALPINGECTOMY;  Surgeon: Ger Flores M.D.;  Location: SURGERY [facility];  Service: Gynecology     Family History   Problem Relation Age of Onset    Arthritis Mother     Heart Disease Maternal Grandmother     Heart Disease Maternal Grandfather     Diabetes Neg Hx     Hypertension Neg Hx     Hyperlipidemia Neg Hx     Stroke Neg Hx     Cancer Neg Hx      Social History     Tobacco Use    Smoking status: Never    Smokeless tobacco: Never    Tobacco comments:     avoid all tobacco products   Vaping Use    Vaping status: Never Used   Substance Use Topics    Alcohol use: Not Currently    Drug use: No     Current Outpatient Medications   Medication Sig Dispense Refill    multivitamin Tab Take 1 Tablet by mouth every day.      Norgestim-Eth Estrad Triphasic (ORTHO TRI-CYCLEN LO) 0.18/0.215/0.25 MG-25 MCG Tab Take 1 Tablet by mouth every day. 84 Tablet 3     No current facility-administered medications for this visit.       /84 (BP Location: Left arm, Patient Position: Sitting, BP Cuff Size: Adult)   Pulse 89   Temp 36.6 °C (97.8 °F) (Temporal)   Ht 1.518 m (4' 11.75\")   Wt 59.6 kg (131 lb 6.4 oz)   SpO2 99%   BMI 25.88 kg/m²     Physical Exam  Vitals reviewed.   Constitutional:       Appearance: Normal appearance.   HENT:      Head: Normocephalic and atraumatic.      Right Ear: External ear normal.      Left Ear: External ear normal.      Nose: Nose normal.      Mouth/Throat:      Mouth: Mucous membranes are moist.      Pharynx: Oropharynx is clear.   Eyes:      Extraocular Movements: Extraocular movements intact.      Conjunctiva/sclera: Conjunctivae normal.   Cardiovascular:      Rate and Rhythm: Normal rate and regular rhythm.      Pulses: Normal pulses.      Heart sounds: Normal heart sounds.   Pulmonary:      Effort: Pulmonary effort is normal.      Breath sounds: Normal breath sounds.   Abdominal:      Palpations: Abdomen is soft.      Tenderness: There is " no abdominal tenderness.   Musculoskeletal:         General: Normal range of motion.      Cervical back: Normal range of motion.   Skin:     General: Skin is warm and dry.   Neurological:      General: No focal deficit present.      Mental Status: She is alert and oriented to person, place, and time.   Psychiatric:         Mood and Affect: Mood normal.         Behavior: Behavior normal.         Assessment and Plan:     1. Arthralgia, unspecified joint  On examination, there are no erythema or inflamed joints noted.  However given age and gender, there is concern for rheumatoid arthritis or lupus.  Will send lab to workup.  If labs are negative, will likely order x-ray.  - RHEUMATOID ARTHRITIS FACTOR; Future  - STACI W/REFLEX IF POSITIVE  - CCP ANTIBODIES, IGG/IGA; Future  - CBC WITH DIFFERENTIAL; Future  - TSH+FREE T4  - Comp Metabolic Panel; Future    2. Mood disturbance  PHQ-9 and FANTA less concerning for anxiety or depression.  Most likely secondary to stress relating to childcare.  Less concern for low estrogen, she still has ovaries.  Will check thyroid levels and send referral to psychology.  - TSH+FREE T4  - Referral to Behavioral Health  - Comp Metabolic Panel; Future    3. Need for vaccination  - INFLUENZA VACCINE TRI INJ (PF)     Return in about 6 weeks (around 4/9/2025).      Teena Mendieta MD PGY I  Internal Medicine  [facility]    This note was created using voice recognition software. While every attempt is made to ensure accuracy of transcription, occasionally errors occur.

## 2025-03-10 ENCOUNTER — HOSPITAL ENCOUNTER (OUTPATIENT)
Dept: LAB | Facility: MEDICAL CENTER | Age: 30
End: 2025-03-10
Payer: COMMERCIAL

## 2025-03-10 DIAGNOSIS — R45.86 MOOD DISTURBANCE: ICD-10-CM

## 2025-03-10 DIAGNOSIS — M25.50 ARTHRALGIA, UNSPECIFIED JOINT: ICD-10-CM

## 2025-03-10 LAB
ALBUMIN SERPL BCP-MCNC: 4.3 G/DL (ref 3.2–4.9)
ALBUMIN/GLOB SERPL: 1.3 G/DL
ALP SERPL-CCNC: 50 U/L (ref 30–99)
ALT SERPL-CCNC: 22 U/L (ref 2–50)
ANION GAP SERPL CALC-SCNC: 10 MMOL/L (ref 7–16)
AST SERPL-CCNC: 24 U/L (ref 12–45)
BASOPHILS # BLD AUTO: 0.6 % (ref 0–1.8)
BASOPHILS # BLD: 0.03 K/UL (ref 0–0.12)
BILIRUB SERPL-MCNC: 0.3 MG/DL (ref 0.1–1.5)
BUN SERPL-MCNC: 9 MG/DL (ref 8–22)
CALCIUM ALBUM COR SERPL-MCNC: 9.2 MG/DL (ref 8.5–10.5)
CALCIUM SERPL-MCNC: 9.4 MG/DL (ref 8.5–10.5)
CHLORIDE SERPL-SCNC: 103 MMOL/L (ref 96–112)
CO2 SERPL-SCNC: 25 MMOL/L (ref 20–33)
CREAT SERPL-MCNC: 0.55 MG/DL (ref 0.5–1.4)
EOSINOPHIL # BLD AUTO: 0.1 K/UL (ref 0–0.51)
EOSINOPHIL NFR BLD: 1.9 % (ref 0–6.9)
ERYTHROCYTE [DISTWIDTH] IN BLOOD BY AUTOMATED COUNT: 42.3 FL (ref 35.9–50)
GFR SERPLBLD CREATININE-BSD FMLA CKD-EPI: 125 ML/MIN/1.73 M 2
GLOBULIN SER CALC-MCNC: 3.2 G/DL (ref 1.9–3.5)
GLUCOSE SERPL-MCNC: 79 MG/DL (ref 65–99)
HCT VFR BLD AUTO: 42.1 % (ref 37–47)
HGB BLD-MCNC: 13.8 G/DL (ref 12–16)
IMM GRANULOCYTES # BLD AUTO: 0.01 K/UL (ref 0–0.11)
IMM GRANULOCYTES NFR BLD AUTO: 0.2 % (ref 0–0.9)
LYMPHOCYTES # BLD AUTO: 1.59 K/UL (ref 1–4.8)
LYMPHOCYTES NFR BLD: 29.5 % (ref 22–41)
MCH RBC QN AUTO: 31.2 PG (ref 27–33)
MCHC RBC AUTO-ENTMCNC: 32.8 G/DL (ref 32.2–35.5)
MCV RBC AUTO: 95 FL (ref 81.4–97.8)
MONOCYTES # BLD AUTO: 0.25 K/UL (ref 0–0.85)
MONOCYTES NFR BLD AUTO: 4.6 % (ref 0–13.4)
NEUTROPHILS # BLD AUTO: 3.41 K/UL (ref 1.82–7.42)
NEUTROPHILS NFR BLD: 63.2 % (ref 44–72)
NRBC # BLD AUTO: 0 K/UL
NRBC BLD-RTO: 0 /100 WBC (ref 0–0.2)
PLATELET # BLD AUTO: 334 K/UL (ref 164–446)
PMV BLD AUTO: 9.3 FL (ref 9–12.9)
POTASSIUM SERPL-SCNC: 4.3 MMOL/L (ref 3.6–5.5)
PROT SERPL-MCNC: 7.5 G/DL (ref 6–8.2)
RBC # BLD AUTO: 4.43 M/UL (ref 4.2–5.4)
RHEUMATOID FACT SER IA-ACNC: <10 IU/ML (ref 0–14)
SODIUM SERPL-SCNC: 138 MMOL/L (ref 135–145)
T4 FREE SERPL-MCNC: 1.19 NG/DL (ref 0.93–1.7)
TSH SERPL-ACNC: 1.28 UIU/ML (ref 0.35–5.5)
WBC # BLD AUTO: 5.4 K/UL (ref 4.8–10.8)

## 2025-03-10 PROCEDURE — 36415 COLL VENOUS BLD VENIPUNCTURE: CPT

## 2025-03-10 PROCEDURE — 84439 ASSAY OF FREE THYROXINE: CPT

## 2025-03-10 PROCEDURE — 80053 COMPREHEN METABOLIC PANEL: CPT

## 2025-03-10 PROCEDURE — 84443 ASSAY THYROID STIM HORMONE: CPT

## 2025-03-10 PROCEDURE — 85025 COMPLETE CBC W/AUTO DIFF WBC: CPT

## 2025-03-10 PROCEDURE — 86431 RHEUMATOID FACTOR QUANT: CPT

## 2025-03-10 PROCEDURE — 86200 CCP ANTIBODY: CPT

## 2025-03-10 PROCEDURE — 86038 ANTINUCLEAR ANTIBODIES: CPT

## 2025-03-12 LAB
CCP IGA+IGG SERPL IA-ACNC: 2 UNITS (ref 0–19)
NUCLEAR IGG SER QL IA: NORMAL

## 2025-04-08 ENCOUNTER — OFFICE VISIT (OUTPATIENT)
Dept: INTERNAL MEDICINE | Facility: OTHER | Age: 30
End: 2025-04-08
Payer: COMMERCIAL

## 2025-04-08 VITALS
BODY MASS INDEX: 25.8 KG/M2 | SYSTOLIC BLOOD PRESSURE: 112 MMHG | HEIGHT: 60 IN | DIASTOLIC BLOOD PRESSURE: 71 MMHG | HEART RATE: 94 BPM | WEIGHT: 131.4 LBS | OXYGEN SATURATION: 100 % | TEMPERATURE: 97.9 F

## 2025-04-08 DIAGNOSIS — M25.50 ARTHRALGIA, UNSPECIFIED JOINT: ICD-10-CM

## 2025-04-08 DIAGNOSIS — R45.86 MOOD DISTURBANCE: ICD-10-CM

## 2025-04-08 PROCEDURE — 3078F DIAST BP <80 MM HG: CPT | Mod: GE

## 2025-04-08 PROCEDURE — 99213 OFFICE O/P EST LOW 20 MIN: CPT | Mod: GE

## 2025-04-08 PROCEDURE — 3074F SYST BP LT 130 MM HG: CPT | Mod: GE

## 2025-04-08 ASSESSMENT — FIBROSIS 4 INDEX: FIB4 SCORE: 0.47

## 2025-04-08 NOTE — PROGRESS NOTES
Established Patient    CC:  Chief Complaint   Patient presents with    Lab Results    Follow-Up       HPI:  Tracy Koenig is a 29 y.o. female with history of ectopic pregnancy s/p salpingectomy and ovarian cysts, who presents today with 6 weeks follow up.     No acute concerns today.  She states that since her last visit, she has stopped taking her birth control medication and changed to Mediterranean diet.  Since then, her symptoms of arthralgia, migraine and mood disturbance have resolved.  Lab work reviewed today, STACI, RA, TSH, CBC and CMP all within normal limits.    Review of Systems   All other systems reviewed and are negative.      Past Medical History:   Diagnosis Date    Ectopic pregnancy     Environmental allergies     ORAL CONTRACEPTION EVAL PRIO TO METHOE W/WO INITIATION     Oral contraceptive use      Social History     Tobacco Use    Smoking status: Never    Smokeless tobacco: Never    Tobacco comments:     avoid all tobacco products   Vaping Use    Vaping status: Never Used   Substance Use Topics    Alcohol use: Not Currently    Drug use: No     Current Outpatient Medications   Medication Sig Dispense Refill    multivitamin Tab Take 1 Tablet by mouth every day.       No current facility-administered medications for this visit.       /71 (BP Location: Left arm, Patient Position: Sitting, BP Cuff Size: Adult)   Pulse 94   Temp 36.6 °C (97.9 °F) (Temporal)   Ht 1.524 m (5')   Wt 59.6 kg (131 lb 6.4 oz)   SpO2 100%   BMI 25.66 kg/m²     Physical Exam  Vitals reviewed.   Constitutional:       Appearance: Normal appearance.   HENT:      Head: Normocephalic.      Right Ear: External ear normal.      Left Ear: External ear normal.      Mouth/Throat:      Mouth: Mucous membranes are moist.      Pharynx: Oropharynx is clear.   Eyes:      Extraocular Movements: Extraocular movements intact.      Conjunctiva/sclera: Conjunctivae normal.      Pupils: Pupils are equal, round, and reactive  to light.   Cardiovascular:      Rate and Rhythm: Normal rate and regular rhythm.      Pulses: Normal pulses.      Heart sounds: Normal heart sounds.   Pulmonary:      Effort: Pulmonary effort is normal.      Breath sounds: Normal breath sounds.   Abdominal:      Palpations: Abdomen is soft.      Tenderness: There is no abdominal tenderness.   Musculoskeletal:         General: No swelling. Normal range of motion.      Cervical back: Normal range of motion.   Skin:     General: Skin is warm and dry.   Neurological:      General: No focal deficit present.      Mental Status: She is alert and oriented to person, place, and time.   Psychiatric:         Mood and Affect: Mood normal.         Behavior: Behavior normal.       Assessment and Plan:   1. Arthralgia, unspecified joint  2. Mood disturbance  Symptoms most likely secondary to birth control medication (Ortho Tri-Cyclen) although she had been taking medications for about 10 years. Essential vitamin deficiency less likely.   - Continue to stay off medication, try another form of birth control if needed  - Continue lifestyle modification with dietary changes and increased physical activities.       Return in about 1 year (around 4/8/2026).      Teena Mendieta MD PGY I  Internal Medicine  [facility]    This note was created using voice recognition software. While every attempt is made to ensure accuracy of transcription, occasionally errors occur.

## 2025-07-17 NOTE — Clinical Note
REFERRAL APPROVAL NOTICE         Sent on July 17, 2025                   Tracy Celeste Arya  [address] [place] [address]                   Dear Ms. Koenig,    After a careful review of the medical information and benefit coverage, [facility] has processed your referral. See below for additional details.    If applicable, you must be actively enrolled with your insurance for coverage of the authorized service. If you have any questions regarding your coverage, please contact your insurance directly.    REFERRAL INFORMATION   Referral #:  [id number]  Referred-To Department    Referred-By Provider:  Behavioral Health    Teena Mendieta M.D.   Behavioral Health Outpatient      [address]  [place] [address]  [phone] [address]  [place] [id number] [phone]    Referral Start Date:  02/26/2025  Referral End Date:   02/26/2026             SCHEDULING  If you do not already have an appointment, please call [phone] to make an appointment.     MORE INFORMATION  If you do not already have a [employer] account, sign up at: [email or web address]  You can access your medical information, make appointments, see lab results, billing information, and more.  If you have questions regarding this referral, please contact  the [facility] Referrals department at:             [phone]. Monday - Friday 8:00AM - 5:00PM.     Sincerely,    [facility]

## (undated) DEVICE — SYSTEM CLEARIFY VISUALIZATION (10EA/PK)

## (undated) DEVICE — GOWN SURGICAL X-LARGE ULTRA - FILM-REINFORCED (20/CA)

## (undated) DEVICE — DRAPE LARGE 3 QUARTER - (20/CA)

## (undated) DEVICE — LIGASURE LAPAROSCOPIC 5MM - (6EA/CA)

## (undated) DEVICE — BAG RETRIEVAL 5MM (10EA/BX)

## (undated) DEVICE — LACTATED RINGERS INJ 1000 ML - (14EA/CA 60CA/PF)

## (undated) DEVICE — TROCAR Z THREAD 11 X 100 - BLADED (6/BX)

## (undated) DEVICE — GOWN WARMING STANDARD FLEX - (30/CA)

## (undated) DEVICE — SCRUB SOLUTION EXIDINE 4% 40Z - 48/CS CHLORAHEXADINE GLUCONATE

## (undated) DEVICE — COVER LIGHT HANDLE ALC PLUS DISP (18EA/BX)

## (undated) DEVICE — SET LEADWIRE 5 LEAD BEDSIDE DISPOSABLE ECG (1SET OF 5/EA)

## (undated) DEVICE — CANISTER SUCTION 3000ML MECHANICAL FILTER AUTO SHUTOFF MEDI-VAC NONSTERILE LF DISP  (40EA/CA)

## (undated) DEVICE — SENSOR OXIMETER ADULT SPO2 RD SET (20EA/BX)

## (undated) DEVICE — SUTURE GENERAL

## (undated) DEVICE — SLEEVE VASO CALF MED - (10PR/CA)

## (undated) DEVICE — TROCAR 5X100 NON BLADED Z-TH - READ KII (6/BX)

## (undated) DEVICE — DRAPESURG STERI-DRAPE LONG - (10/BX 4BX/CA)

## (undated) DEVICE — TUBING CLEARLINK DUO-VENT - C-FLO (48EA/CA)

## (undated) DEVICE — NEEDLE INSFL 120MM 14GA VRRS - (20/BX)

## (undated) DEVICE — ELECTRODE DUAL RETURN W/ CORD - (50/PK)

## (undated) DEVICE — SUCTION INSTRUMENT YANKAUER BULBOUS TIP W/O VENT (50EA/CA)

## (undated) DEVICE — PACK LAPAROSCOPY - (1/CA)

## (undated) DEVICE — BAG RETRIEVAL 10ML (10EA/BX)

## (undated) DEVICE — PAD SANITARY 11IN MAXI IND WRAPPED  (12EA/PK 24PK/CA)

## (undated) DEVICE — SET SUCTION/IRRIGATION WITH DISPOSABLE TIP (6/CA )PART #0250-070-520 IS A SUB

## (undated) DEVICE — TRAY CATHETER FOLEY URINE METER W/STATLOCK 350ML (10EA/CA)

## (undated) DEVICE — GLOVE BIOGEL SZ 7 SURGICAL PF LTX - (50PR/BX 4BX/CA)

## (undated) DEVICE — SUTURE 4-0 MONOCRYL PLUS P-3 (12PK/BX)

## (undated) DEVICE — CANNULA W/SEAL 5X100 Z-THRE - ADED KII (12/BX)

## (undated) DEVICE — GLOVE BIOGEL INDICATOR SZ 7.5 SURGICAL PF LTX - (50PR/BX 4BX/CA)

## (undated) DEVICE — SET EXTENSION WITH 2 PORTS (48EA/CA) ***PART #2C8610 IS A SUBSTITUTE*****

## (undated) DEVICE — JELLY SURGILUBE STERILE TUBE 4.25 OZ (1/EA)

## (undated) DEVICE — SODIUM CHL IRRIGATION 0.9% 1000ML (12EA/CA)